# Patient Record
Sex: MALE | Race: WHITE | NOT HISPANIC OR LATINO | URBAN - METROPOLITAN AREA
[De-identification: names, ages, dates, MRNs, and addresses within clinical notes are randomized per-mention and may not be internally consistent; named-entity substitution may affect disease eponyms.]

---

## 2017-01-15 ENCOUNTER — EMERGENCY (EMERGENCY)
Facility: HOSPITAL | Age: 82
LOS: 1 days | Discharge: HOME | End: 2017-01-15
Attending: EMERGENCY MEDICINE | Admitting: EMERGENCY MEDICINE
Payer: MEDICARE

## 2017-01-15 VITALS
DIASTOLIC BLOOD PRESSURE: 81 MMHG | OXYGEN SATURATION: 99 % | RESPIRATION RATE: 16 BRPM | SYSTOLIC BLOOD PRESSURE: 164 MMHG | HEART RATE: 55 BPM | TEMPERATURE: 98 F

## 2017-01-15 VITALS
TEMPERATURE: 98 F | DIASTOLIC BLOOD PRESSURE: 79 MMHG | SYSTOLIC BLOOD PRESSURE: 134 MMHG | OXYGEN SATURATION: 100 % | RESPIRATION RATE: 16 BRPM | HEART RATE: 60 BPM

## 2017-01-15 DIAGNOSIS — S09.90XA UNSPECIFIED INJURY OF HEAD, INITIAL ENCOUNTER: ICD-10-CM

## 2017-01-15 DIAGNOSIS — Z79.82 LONG TERM (CURRENT) USE OF ASPIRIN: ICD-10-CM

## 2017-01-15 DIAGNOSIS — S01.112A LACERATION WITHOUT FOREIGN BODY OF LEFT EYELID AND PERIOCULAR AREA, INITIAL ENCOUNTER: ICD-10-CM

## 2017-01-15 DIAGNOSIS — Z79.899 OTHER LONG TERM (CURRENT) DRUG THERAPY: ICD-10-CM

## 2017-01-15 DIAGNOSIS — I10 ESSENTIAL (PRIMARY) HYPERTENSION: ICD-10-CM

## 2017-01-15 DIAGNOSIS — Z98.41 CATARACT EXTRACTION STATUS, RIGHT EYE: Chronic | ICD-10-CM

## 2017-01-15 DIAGNOSIS — E10.9 TYPE 1 DIABETES MELLITUS WITHOUT COMPLICATIONS: ICD-10-CM

## 2017-01-15 DIAGNOSIS — Z98.42 CATARACT EXTRACTION STATUS, LEFT EYE: Chronic | ICD-10-CM

## 2017-01-15 DIAGNOSIS — I25.10 ATHEROSCLEROTIC HEART DISEASE OF NATIVE CORONARY ARTERY WITHOUT ANGINA PECTORIS: ICD-10-CM

## 2017-01-15 DIAGNOSIS — Z23 ENCOUNTER FOR IMMUNIZATION: ICD-10-CM

## 2017-01-15 DIAGNOSIS — Z98.52 VASECTOMY STATUS: Chronic | ICD-10-CM

## 2017-01-15 PROCEDURE — 70450 CT HEAD/BRAIN W/O DYE: CPT | Mod: 26

## 2017-01-15 PROCEDURE — 99284 EMERGENCY DEPT VISIT MOD MDM: CPT | Mod: 25

## 2017-01-15 PROCEDURE — 12013 RPR F/E/E/N/L/M 2.6-5.0 CM: CPT

## 2017-01-15 RX ORDER — INSULIN LISPRO 100/ML
0 VIAL (ML) SUBCUTANEOUS
Qty: 0 | Refills: 0 | COMMUNITY

## 2017-01-15 RX ORDER — ZOLPIDEM TARTRATE 10 MG/1
0 TABLET ORAL
Qty: 0 | Refills: 0 | COMMUNITY

## 2017-01-15 RX ORDER — ASPIRIN/CALCIUM CARB/MAGNESIUM 324 MG
1 TABLET ORAL
Qty: 0 | Refills: 0 | COMMUNITY

## 2017-01-15 RX ORDER — METOPROLOL TARTRATE 50 MG
0 TABLET ORAL
Qty: 0 | Refills: 0 | COMMUNITY

## 2017-01-15 RX ORDER — TETANUS TOXOID, REDUCED DIPHTHERIA TOXOID AND ACELLULAR PERTUSSIS VACCINE, ADSORBED 5; 2.5; 8; 8; 2.5 [IU]/.5ML; [IU]/.5ML; UG/.5ML; UG/.5ML; UG/.5ML
0.5 SUSPENSION INTRAMUSCULAR ONCE
Qty: 0 | Refills: 0 | Status: COMPLETED | OUTPATIENT
Start: 2017-01-15 | End: 2017-01-15

## 2017-01-15 RX ORDER — ATORVASTATIN CALCIUM 80 MG/1
0 TABLET, FILM COATED ORAL
Qty: 0 | Refills: 0 | COMMUNITY

## 2017-01-15 RX ORDER — INSULIN GLARGINE 100 [IU]/ML
0 INJECTION, SOLUTION SUBCUTANEOUS
Qty: 0 | Refills: 0 | COMMUNITY

## 2017-01-15 RX ADMIN — TETANUS TOXOID, REDUCED DIPHTHERIA TOXOID AND ACELLULAR PERTUSSIS VACCINE, ADSORBED 0.5 MILLILITER(S): 5; 2.5; 8; 8; 2.5 SUSPENSION INTRAMUSCULAR at 10:47

## 2017-01-15 NOTE — ED PROVIDER NOTE - OBJECTIVE STATEMENT
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  Patient definitely remembers tripping and falling and denies prodromal chest pain, back pain, abdominal pain headache, weakness, numbness, or vision changes.  Denies syncope.  Patient landed on his LEFT elbow, BILATERAL knees and struck the LEFT side of his face on the curb.  Patient denies LOC but states he was a little "foggy" for a few minutes.  Now he has no complaints except mild abrasions to bilateral knees, abrasion to LEFT forehead and a laceration to his LEFT eyebrow.  LEFT eyelid has become swollen as well.  No globe injury.  No headache, nausea, or other complaints. Last tetanus unknown.

## 2017-01-15 NOTE — ED ADULT TRIAGE NOTE - CHIEF COMPLAINT QUOTE
Patient s/p trip and fall while crossing the street, hitting head on pavement. Laceration to left brow

## 2017-01-15 NOTE — ED PROVIDER NOTE - PMH
Coronary artery disease involving native coronary artery of native heart without angina pectoris    Essential hypertension    ST elevation myocardial infarction (STEMI), unspecified artery  2006  Stented coronary artery    Type 1 diabetes mellitus without complication

## 2017-01-15 NOTE — ED PROVIDER NOTE - CARE PLAN
Principal Discharge DX:	Closed head injury, initial encounter  Secondary Diagnosis:	Eyebrow laceration, left, initial encounter

## 2017-01-15 NOTE — ED PROVIDER NOTE - PHYSICAL EXAMINATION
Head NCAT except for LEFT forhead with abrasion (cleaned and bacitracin applied) and LEFT eyebrow with 3 cm laceration.  LEFT eyelid swollen with mild hematoma, ice applied.

## 2017-01-15 NOTE — ED PROVIDER NOTE - MEDICAL DECISION MAKING DETAILS
Awake, alert, oriented and nontoxic patient had mechanical fall resulting in abrasions and LEFT eyebrow laceration.  Neuro exam normal. Gait/balance normal. CT negative, will discharge to company of friends.

## 2017-01-15 NOTE — ED PROCEDURE NOTE - CPROC ED POST PROC CARE GUIDE1
Verbal/written post procedure instructions were given to patient/caregiver./Instructed patient/caregiver regarding signs and symptoms of infection./Instructed patient/caregiver to follow-up with primary care physician.

## 2017-01-15 NOTE — ED PROVIDER NOTE - PROGRESS NOTE DETAILS
CT negative, tetanus updated, wounds cleaned and bacitracin applied.  LEFT eyebrow laceration sutured (see notes).

## 2017-01-20 ENCOUNTER — EMERGENCY (EMERGENCY)
Facility: HOSPITAL | Age: 82
LOS: 1 days | Discharge: HOME | End: 2017-01-20
Attending: EMERGENCY MEDICINE | Admitting: EMERGENCY MEDICINE
Payer: MEDICARE

## 2017-01-20 VITALS
SYSTOLIC BLOOD PRESSURE: 140 MMHG | OXYGEN SATURATION: 98 % | TEMPERATURE: 98 F | RESPIRATION RATE: 18 BRPM | HEART RATE: 56 BPM | DIASTOLIC BLOOD PRESSURE: 53 MMHG

## 2017-01-20 DIAGNOSIS — Z98.52 VASECTOMY STATUS: Chronic | ICD-10-CM

## 2017-01-20 DIAGNOSIS — S01.81XD LACERATION WITHOUT FOREIGN BODY OF OTHER PART OF HEAD, SUBSEQUENT ENCOUNTER: ICD-10-CM

## 2017-01-20 DIAGNOSIS — Z79.899 OTHER LONG TERM (CURRENT) DRUG THERAPY: ICD-10-CM

## 2017-01-20 DIAGNOSIS — I25.10 ATHEROSCLEROTIC HEART DISEASE OF NATIVE CORONARY ARTERY WITHOUT ANGINA PECTORIS: ICD-10-CM

## 2017-01-20 DIAGNOSIS — Z98.42 CATARACT EXTRACTION STATUS, LEFT EYE: Chronic | ICD-10-CM

## 2017-01-20 DIAGNOSIS — Z98.41 CATARACT EXTRACTION STATUS, RIGHT EYE: Chronic | ICD-10-CM

## 2017-01-20 DIAGNOSIS — E11.9 TYPE 2 DIABETES MELLITUS WITHOUT COMPLICATIONS: ICD-10-CM

## 2017-01-20 DIAGNOSIS — Z79.4 LONG TERM (CURRENT) USE OF INSULIN: ICD-10-CM

## 2017-01-20 DIAGNOSIS — I10 ESSENTIAL (PRIMARY) HYPERTENSION: ICD-10-CM

## 2017-01-20 PROCEDURE — 99282 EMERGENCY DEPT VISIT SF MDM: CPT

## 2017-01-20 NOTE — ED PROVIDER NOTE - OBJECTIVE STATEMENT
Patient here for uncomplicated suture removal.  Had 5 sutures placed to L eyebrow area 6d ago.   No signs of infection.

## 2017-01-20 NOTE — ED PROVIDER NOTE - MEDICAL DECISION MAKING DETAILS
Uncomplicated suture removal. Patient counseled on wound care and scar formation.   5th suture may be buried under scab. Pt will return for that if it remains after thick scab has fallen off.

## 2017-01-20 NOTE — ED PROVIDER NOTE - SKIN, MLM
Eyebrow laceration covered in large scab, 4 sutures visualized and removed. 5th not located ? fell out or is buried under thick scab.

## 2017-01-20 NOTE — ED ADULT TRIAGE NOTE - CHIEF COMPLAINT QUOTE
"I came to come back and get my stitches removed" +multiple bruises to face, +sutures to left eyebrow area.

## 2017-02-20 PROBLEM — Z95.5 PRESENCE OF CORONARY ANGIOPLASTY IMPLANT AND GRAFT: Chronic | Status: ACTIVE | Noted: 2017-01-15

## 2017-02-20 PROBLEM — I10 ESSENTIAL (PRIMARY) HYPERTENSION: Chronic | Status: ACTIVE | Noted: 2017-01-15

## 2017-02-20 PROBLEM — E10.9 TYPE 1 DIABETES MELLITUS WITHOUT COMPLICATIONS: Chronic | Status: ACTIVE | Noted: 2017-01-15

## 2017-02-20 PROBLEM — I21.3 ST ELEVATION (STEMI) MYOCARDIAL INFARCTION OF UNSPECIFIED SITE: Chronic | Status: ACTIVE | Noted: 2017-01-15

## 2017-02-20 PROBLEM — I25.10 ATHEROSCLEROTIC HEART DISEASE OF NATIVE CORONARY ARTERY WITHOUT ANGINA PECTORIS: Chronic | Status: ACTIVE | Noted: 2017-01-15

## 2017-07-12 PROBLEM — Z00.00 ENCOUNTER FOR PREVENTIVE HEALTH EXAMINATION: Status: ACTIVE | Noted: 2017-07-12

## 2017-07-26 DIAGNOSIS — I71.4 ABDOMINAL AORTIC ANEURYSM, W/OUT RUPTURE: ICD-10-CM

## 2017-07-26 DIAGNOSIS — G47.30 SLEEP APNEA, UNSPECIFIED: ICD-10-CM

## 2017-07-26 DIAGNOSIS — I25.10 ATHEROSCLEROTIC HEART DISEASE OF NATIVE CORONARY ARTERY W/OUT ANGINA PECTORIS: ICD-10-CM

## 2017-07-26 DIAGNOSIS — N40.0 BENIGN PROSTATIC HYPERPLASIA WITHOUT LOWER URINARY TRACT SYMPMS: ICD-10-CM

## 2017-07-26 RX ORDER — ASPIRIN ENTERIC COATED TABLETS 81 MG 81 MG/1
81 TABLET, DELAYED RELEASE ORAL DAILY
Refills: 0 | Status: ACTIVE | COMMUNITY
Start: 2017-07-26

## 2017-07-26 RX ORDER — CHLORHEXIDINE GLUCONATE 4 %
1000 LIQUID (ML) TOPICAL
Refills: 0 | Status: ACTIVE | COMMUNITY
Start: 2017-07-26

## 2017-07-27 DIAGNOSIS — I25.2 OLD MYOCARDIAL INFARCTION: ICD-10-CM

## 2017-07-27 DIAGNOSIS — Z81.8 FAMILY HISTORY OF OTHER MENTAL AND BEHAVIORAL DISORDERS: ICD-10-CM

## 2017-07-27 DIAGNOSIS — Z80.0 FAMILY HISTORY OF MALIGNANT NEOPLASM OF DIGESTIVE ORGANS: ICD-10-CM

## 2017-07-28 ENCOUNTER — APPOINTMENT (OUTPATIENT)
Dept: ENDOCRINOLOGY | Facility: CLINIC | Age: 82
End: 2017-07-28
Payer: MEDICARE

## 2017-07-28 VITALS
DIASTOLIC BLOOD PRESSURE: 60 MMHG | TEMPERATURE: 98.1 F | WEIGHT: 155 LBS | HEART RATE: 64 BPM | HEIGHT: 68.5 IN | SYSTOLIC BLOOD PRESSURE: 140 MMHG | BODY MASS INDEX: 23.22 KG/M2 | OXYGEN SATURATION: 96 %

## 2017-07-28 DIAGNOSIS — E55.9 VITAMIN D DEFICIENCY, UNSPECIFIED: ICD-10-CM

## 2017-07-28 PROCEDURE — 99214 OFFICE O/P EST MOD 30 MIN: CPT

## 2017-08-21 ENCOUNTER — FORM ENCOUNTER (OUTPATIENT)
Age: 82
End: 2017-08-21

## 2017-08-22 ENCOUNTER — MEDICATION RENEWAL (OUTPATIENT)
Age: 82
End: 2017-08-22

## 2017-11-03 ENCOUNTER — APPOINTMENT (OUTPATIENT)
Dept: ENDOCRINOLOGY | Facility: CLINIC | Age: 82
End: 2017-11-03
Payer: MEDICARE

## 2017-11-03 VITALS
HEART RATE: 71 BPM | OXYGEN SATURATION: 96 % | BODY MASS INDEX: 24.33 KG/M2 | DIASTOLIC BLOOD PRESSURE: 71 MMHG | TEMPERATURE: 97.5 F | WEIGHT: 164.25 LBS | HEIGHT: 69 IN | SYSTOLIC BLOOD PRESSURE: 135 MMHG

## 2017-11-03 PROCEDURE — 99214 OFFICE O/P EST MOD 30 MIN: CPT

## 2017-11-14 ENCOUNTER — MEDICATION RENEWAL (OUTPATIENT)
Age: 82
End: 2017-11-14

## 2017-11-17 ENCOUNTER — MEDICATION RENEWAL (OUTPATIENT)
Age: 82
End: 2017-11-17

## 2017-12-13 ENCOUNTER — MEDICATION RENEWAL (OUTPATIENT)
Age: 82
End: 2017-12-13

## 2017-12-13 DIAGNOSIS — G47.00 INSOMNIA, UNSPECIFIED: ICD-10-CM

## 2018-02-01 ENCOUNTER — MEDICATION RENEWAL (OUTPATIENT)
Age: 83
End: 2018-02-01

## 2018-02-02 ENCOUNTER — APPOINTMENT (OUTPATIENT)
Dept: ENDOCRINOLOGY | Facility: CLINIC | Age: 83
End: 2018-02-02
Payer: MEDICARE

## 2018-02-02 VITALS
HEART RATE: 56 BPM | SYSTOLIC BLOOD PRESSURE: 158 MMHG | TEMPERATURE: 97.8 F | OXYGEN SATURATION: 99 % | DIASTOLIC BLOOD PRESSURE: 66 MMHG | WEIGHT: 159 LBS | BODY MASS INDEX: 23.82 KG/M2 | HEIGHT: 68.5 IN

## 2018-02-02 PROCEDURE — 99214 OFFICE O/P EST MOD 30 MIN: CPT

## 2018-02-27 ENCOUNTER — MEDICATION RENEWAL (OUTPATIENT)
Age: 83
End: 2018-02-27

## 2018-02-27 RX ORDER — METOPROLOL SUCCINATE 25 MG/1
25 TABLET, EXTENDED RELEASE ORAL DAILY
Qty: 90 | Refills: 3 | Status: ACTIVE | COMMUNITY
Start: 2018-02-27 | End: 1900-01-01

## 2018-05-02 ENCOUNTER — MEDICATION RENEWAL (OUTPATIENT)
Age: 83
End: 2018-05-02

## 2018-06-01 ENCOUNTER — OUTPATIENT (OUTPATIENT)
Dept: OUTPATIENT SERVICES | Facility: HOSPITAL | Age: 83
LOS: 1 days | End: 2018-06-01
Payer: MEDICARE

## 2018-06-01 ENCOUNTER — APPOINTMENT (OUTPATIENT)
Dept: RADIOLOGY | Facility: CLINIC | Age: 83
End: 2018-06-01

## 2018-06-01 ENCOUNTER — APPOINTMENT (OUTPATIENT)
Dept: ORTHOPEDIC SURGERY | Facility: CLINIC | Age: 83
End: 2018-06-01
Payer: MEDICARE

## 2018-06-01 VITALS — WEIGHT: 159 LBS | BODY MASS INDEX: 23.82 KG/M2 | RESPIRATION RATE: 16 BRPM | HEIGHT: 68.5 IN

## 2018-06-01 DIAGNOSIS — Z98.41 CATARACT EXTRACTION STATUS, RIGHT EYE: Chronic | ICD-10-CM

## 2018-06-01 DIAGNOSIS — M25.562 PAIN IN LEFT KNEE: ICD-10-CM

## 2018-06-01 DIAGNOSIS — Z98.52 VASECTOMY STATUS: Chronic | ICD-10-CM

## 2018-06-01 DIAGNOSIS — M17.0 BILATERAL PRIMARY OSTEOARTHRITIS OF KNEE: ICD-10-CM

## 2018-06-01 DIAGNOSIS — Z98.42 CATARACT EXTRACTION STATUS, LEFT EYE: Chronic | ICD-10-CM

## 2018-06-01 PROCEDURE — 73564 X-RAY EXAM KNEE 4 OR MORE: CPT | Mod: 26,LT

## 2018-06-01 PROCEDURE — 99204 OFFICE O/P NEW MOD 45 MIN: CPT

## 2018-06-01 PROCEDURE — 73564 X-RAY EXAM KNEE 4 OR MORE: CPT

## 2018-08-01 ENCOUNTER — APPOINTMENT (OUTPATIENT)
Dept: ENDOCRINOLOGY | Facility: CLINIC | Age: 83
End: 2018-08-01
Payer: MEDICARE

## 2018-08-01 VITALS
BODY MASS INDEX: 23.67 KG/M2 | TEMPERATURE: 98.7 F | DIASTOLIC BLOOD PRESSURE: 65 MMHG | OXYGEN SATURATION: 97 % | HEIGHT: 68.5 IN | SYSTOLIC BLOOD PRESSURE: 125 MMHG | HEART RATE: 71 BPM | WEIGHT: 158 LBS

## 2018-08-01 PROCEDURE — 99214 OFFICE O/P EST MOD 30 MIN: CPT

## 2018-08-13 ENCOUNTER — MEDICATION RENEWAL (OUTPATIENT)
Age: 83
End: 2018-08-13

## 2018-10-19 ENCOUNTER — MEDICATION RENEWAL (OUTPATIENT)
Age: 83
End: 2018-10-19

## 2018-10-19 RX ORDER — ENALAPRIL MALEATE 5 MG/1
5 TABLET ORAL DAILY
Qty: 90 | Refills: 3 | Status: ACTIVE | COMMUNITY
Start: 2017-07-26 | End: 1900-01-01

## 2018-11-04 ENCOUNTER — MEDICATION RENEWAL (OUTPATIENT)
Age: 83
End: 2018-11-04

## 2018-11-04 RX ORDER — BLOOD SUGAR DIAGNOSTIC
STRIP MISCELLANEOUS
Qty: 400 | Refills: 3 | Status: ACTIVE | COMMUNITY
Start: 2017-11-17 | End: 1900-01-01

## 2018-11-04 RX ORDER — LANCETS
EACH MISCELLANEOUS
Qty: 400 | Refills: 3 | Status: ACTIVE | COMMUNITY
Start: 2018-11-04 | End: 1900-01-01

## 2018-12-03 ENCOUNTER — APPOINTMENT (OUTPATIENT)
Dept: ENDOCRINOLOGY | Facility: CLINIC | Age: 83
End: 2018-12-03
Payer: MEDICARE

## 2018-12-03 VITALS
TEMPERATURE: 98.3 F | SYSTOLIC BLOOD PRESSURE: 137 MMHG | HEART RATE: 59 BPM | DIASTOLIC BLOOD PRESSURE: 65 MMHG | WEIGHT: 153 LBS | HEIGHT: 68.5 IN | BODY MASS INDEX: 22.92 KG/M2 | OXYGEN SATURATION: 97 %

## 2018-12-03 PROCEDURE — 99214 OFFICE O/P EST MOD 30 MIN: CPT

## 2019-01-14 ENCOUNTER — MEDICATION RENEWAL (OUTPATIENT)
Age: 84
End: 2019-01-14

## 2019-01-23 ENCOUNTER — MEDICATION RENEWAL (OUTPATIENT)
Age: 84
End: 2019-01-23

## 2019-02-08 ENCOUNTER — MEDICATION RENEWAL (OUTPATIENT)
Age: 84
End: 2019-02-08

## 2019-04-10 ENCOUNTER — APPOINTMENT (OUTPATIENT)
Dept: ENDOCRINOLOGY | Facility: CLINIC | Age: 84
End: 2019-04-10
Payer: MEDICARE

## 2019-04-10 VITALS
HEART RATE: 61 BPM | TEMPERATURE: 97.9 F | BODY MASS INDEX: 22.62 KG/M2 | SYSTOLIC BLOOD PRESSURE: 135 MMHG | WEIGHT: 151 LBS | OXYGEN SATURATION: 98 % | HEIGHT: 68.5 IN | DIASTOLIC BLOOD PRESSURE: 68 MMHG

## 2019-04-10 DIAGNOSIS — M65.331 TRIGGER FINGER, RIGHT MIDDLE FINGER: ICD-10-CM

## 2019-04-10 PROCEDURE — 99214 OFFICE O/P EST MOD 30 MIN: CPT

## 2019-04-12 PROBLEM — M65.331 TRIGGER MIDDLE FINGER OF RIGHT HAND: Status: ACTIVE | Noted: 2019-04-10

## 2019-04-12 NOTE — ASSESSMENT
[FreeTextEntry1] : 1) Type 2 DM:\par --Given his report of occasional overnight hypoglycemia (whichi is clearly not due to a carryover of his pre-supper Humalog), will decrease the Lantus to 15 units.  He is to let me know if he has any further overnight hypoglycemia.\par \par 2)\par \par 3) Hypertension:  Given the hyperkalemia on his current bloodwork, which does not seem to be related to dietary factors (intake of citrus fruit, potatoes, etc is minimal), to decrease the enalapril to 2.5 mg/day

## 2019-04-12 NOTE — PHYSICAL EXAM
[Alert] : alert [Normal Sclera/Conjunctiva] : normal sclera/conjunctiva [Healthy Appearance] : healthy appearance [EOMI] : extra ocular movement intact [No Proptosis] : no proptosis [Normal Outer Ear/Nose] : the ears and nose were normal in appearance [No Lid Lag] : no lid lag [No Neck Mass] : no neck mass was observed [Thyroid Not Enlarged] : the thyroid was not enlarged [No LAD] : no lymphadenopathy [Regular Rhythm] : with a regular rhythm [Clear to Auscultation] : lungs were clear to auscultation bilaterally [Normal Rate] : heart rate was normal  [No Edema] : there was no peripheral edema [Carotids Normal] : carotid pulses were normal with no bruits [Soft] : abdomen soft [Not Tender] : non-tender [No HSM] : no hepato-splenomegaly [Normal] : normal and non tender [Normal Gait] : normal gait [No Joint Swelling] : no joint swelling seen [No Involuntary Movements] : no involuntary movements were seen [Normal Strength/Tone] : muscle strength and tone were normal [No Rash] : no rash [No Tremors] : no tremors [Normal Sensation on Monofilament Testing] : normal sensation on monofilament testing of lower extremities [Normal Affect] : the affect was normal [Normal Mood] : the mood was normal [Gynecomastia] : no gynecomastia [Kyphosis] : no kyphosis present [Foot Ulcers] : no foot ulcers [Acanthosis Nigricans] : no acanthosis nigricans [de-identified] : Appears thinner [de-identified] : Hearing aids in both ears [de-identified] : Pupils miotic.  No corneal arcus or xanthelasma [de-identified] : DP pulses 1+ on the left, non-palpable (with ?? slightly delayed capillary refill) on the right [de-identified] : No murmurs [de-identified] : Moderate triggering of the R third finger with thickened flexor tendon.  Flexion deformity and Dupuytrems contracture L 4th finger [de-identified] : No cervical or supraclavicular adenopathy [de-identified] : Vibratory sensation moderately decreased over the toes

## 2019-04-12 NOTE — HISTORY OF PRESENT ILLNESS
[FreeTextEntry1] : 85-year-old man who is followed for insulin-requiring type 2 DM, hyperlipidemia and hypertension.  HIs diabetes was initially diagnosed in 1983, and he has been insulin-requiring since 1993.  His glycemic control has worsened somewhat in the past few years, with a rise in A1c levels to the 7.5-8.5% range.  Barriers to improved control include the large number of meals which he eats  out of the house, as well as his worsening cognitive function.  HIs other active medical problems include IHD (s/p NSTEMI and emergency PCI/stent to the RCA in 2006), moderate dementia, and a stable abdominal aortic aneurysm.\par \par As usual, came to the visit accompanied by his daughter.\antonella Says that he feels "great," though is aware that his weight continues to drift down.  Says that his appetite is fine.\antonella --Is still having cereal (either GrapeNuts or oatmeal) for breakfast.  Does not see any significant difference in his pre-lunch fingersticks between the two of these\par --Lunch is still salad from [employer], but does not always have bread with it.  Eats lunch with his friends 1-2X/week, sometimes Chinese food.\par --Supper is a restaurant meal or takeout foot.  (Will "sometimes" have dessert when eating out).  Protein is fish 2-3X/week\antonella Has been monitoring fingersticks 4X/day, but forgot to bring his log book.  Fingersticks seem to be fluctuating in the 100-300 range, but in no particular pattern, and he is unable to correlate the higher numbers with his food intake.  Says that he misses very few insulin doses--only if he has an "irregular" day.\antonella Has had a few hypoglycemic reactions in the middle of the night.\par Still playing tennis twice a week, swimming at least 2-3X/week.\par Other MDs--Saw Dr. Dumont (Cardiology) in January, will be seeing Dr. Lam next week.  Is not sure when he is due to see his ophthalmologist Dr. Carcamo.\antonella Admits to mild paresthesias in his toes.\par \par \antonella

## 2019-04-12 NOTE — REVIEW OF SYSTEMS
[Fatigue] : no fatigue [Decreased Appetite] : appetite not decreased [Chills] : no chills [Fever] : no fever [Blurry Vision] : no blurred vision [Dry Eyes] : no dryness of the eyes [Dysphagia] : no dysphagia [Eyes Itch] : no itching of the eyes [Palpitations] : no palpitations [Chest Pain] : no chest pain [Lower Ext Edema] : no lower extremity edema [Wheezing] : no wheezing was heard [Shortness Of Breath] : no shortness of breath [Cough] : no cough [SOB on Exertion] : no shortness of breath during exertion [Nausea] : no nausea [Constipation] : no constipation [Diarrhea] : no diarrhea [Polyuria] : no polyuria [Heartburn] : no heartburn [Dysuria] : no dysuria [Joint Pain] : no joint pain [Joint Stiffness] : no joint stiffness [Muscle Cramps] : no muscle cramps [Myalgia] : no myalgia  [Hair Loss] : no hair loss [Headache] : no headaches [Dry Skin] : no dry skin [Pain/Numbness of Digits] : no pain/numbness of digits [Tremors] : no tremors [Poor Balance] : steady state balance [Difficulty Walking] : no difficulty walking [Depression] : no depression [Suicidal Ideation] : no suicidal ideation [Homicidal Ideation] : no homicidal ideation [Polydipsia] : no polydipsia [Cold Intolerance] : cold tolerant [Easy Bleeding] : no ~M tendency for easy bleeding [Heat Intolerance] : heat tolerant [Easy Bruising] : no tendency for easy bruising [FreeTextEntry2] : Has lost another 2 lb in weight [FreeTextEntry4] : Says that he has been wearing his hearing aids more consistently [FreeTextEntry8] : Nocturia once a night [de-identified] : Admits to "memory problems"

## 2019-05-14 ENCOUNTER — RX RENEWAL (OUTPATIENT)
Age: 84
End: 2019-05-14

## 2019-08-02 ENCOUNTER — APPOINTMENT (OUTPATIENT)
Dept: ENDOCRINOLOGY | Facility: CLINIC | Age: 84
End: 2019-08-02
Payer: MEDICARE

## 2019-08-02 VITALS
TEMPERATURE: 98.6 F | OXYGEN SATURATION: 95 % | SYSTOLIC BLOOD PRESSURE: 151 MMHG | BODY MASS INDEX: 23.22 KG/M2 | DIASTOLIC BLOOD PRESSURE: 69 MMHG | HEART RATE: 68 BPM | HEIGHT: 68.5 IN | WEIGHT: 155 LBS

## 2019-08-02 PROCEDURE — 99214 OFFICE O/P EST MOD 30 MIN: CPT

## 2019-08-02 NOTE — ASSESSMENT
[FreeTextEntry1] : 1) Type 2 DM:  Glycemic control is obviously poor, but the A1c level correlates with the fingersticks, many of which are over 200 mg%, occasionally even over 400 mg%.  He seem to be taking his insulin injections fairly consistently.  His diet history is almost certainly "incomplete," since he has managed to gain 4 lb in weight despite the poor glycemic control.  His cognitive deficits prevent him from correlating his glycemic fluctuations with his food intake or exercise.  \par --Evaluated his ability to correctly draw up insulin, and he did it perfectly--(even injected air into the bottle before drawning up the insulin)\par --Discussed possible causes for the unexplained glucose spikes with his daughter, who indicated that he eats out with friends for lunch at least twice a week (and thinks that at least some of these are to Chinese restaurants) and also suspects that at least some of the restaurant suppers include desserts.\par --Suspect that his cappucinos in the afternoon may be sweetened with regular sugar\par --Given the reasonable control of his morning blood sugars, plus the stable or decreasing trend of his glucoses overnight, will not increase the Lantus.  Will also not try to "salazar" the intermittent daytime hyperglycemia with higher Novolog doses given the occasional fingersticks near or below 100 and the risk of hypoglycemia if he fails to "cheat" with his diet.\par \par 2) Hyperlipidemia:  LDL-cholesterol is only slightly above his target of 70 mg%\par --Continue atorvastatin 80 mg/day\par \par 3) Hypertension:  Systolic BP is mildly elevated today, but he did not take any of his BP meds this morning\par \par See for follow-up in 4 months.  CMP, lipids, A1c, CBC, B-12 before the next visit\par  [FreeTextEntry2] : Diet, assessment of insulin drawing technique

## 2019-08-02 NOTE — HISTORY OF PRESENT ILLNESS
[FreeTextEntry1] : 85-year-old man who is followed for insulin-requiring type 2 DM, hyperlipidemia and hypertension.  HIs diabetes was initially diagnosed in 1983, and he has been insulin-requiring since 1993.  His glycemic control has worsened somewhat in the past few years, with a rise in A1c levels to the 7.5-9.0% range.  Barriers to improved control include the large number of meals which he eats  out of the house, as well as his worsening cognitive function.  HIs other active medical problems include IHD (s/p NSTEMI and emergency PCI/stent to the RCA in 2006), moderate dementia, and a stable abdominal aortic aneurysm.\par \antonella As usual, came to the visit accompanied by his daughter\antonella Is still testing fingersticks 4X/day, but says that they are higher and his log book verifies this. Has been taking 15 units of Lantus qhs, pre-meal Novolog 11/12/15. Has numerous readings in the 200s and 300s.  The only patterns which are noticeable are that his morning fingersticks are frequently in the 100-180 range, and the highest readings are before supper.  Had one spike to 527 two nights ago after eating out, but does not remember where he went or what he ate.  \par Breakfast is still cereal--most often GrapeNuts (measures this with a scoop), occasionally oatmeal.  Has blueberries with the cereal,, sometimes with "a few oz" of OJ.  He does some type of exercise (tennis or swimming) after breakfast 5 days/week, almost never on Saturday, often not on Sunday.  Lunch is takeout food from [employer], usually a salad, often without any bread.  He denies any snacking in the afternoon, but will sometimes have a capuccino in the afternoon.  (He does not add any sweetener to the cappucino, but is not sure whether any is added by the store).  Supper is almost always eaten out.\antonella Denies missing any insulin injections  \antonella Saw Dr. Barry regarding the R 3rd trigger finger, and apparently received a steroid injection.  (Nothing was apparently done or suggested about the flexion contracture of the left 4th finger.\antonella Seeing his cardiologist Dr. Dumont once a year.  (Last saw her in January).\antonella Sees his ophthalmologist Dr. Carcamo once a year as well, and is up to date

## 2019-08-02 NOTE — PHYSICAL EXAM
[Alert] : alert [Well Nourished] : well nourished [Healthy Appearance] : healthy appearance [Normal Sclera/Conjunctiva] : normal sclera/conjunctiva [No Proptosis] : no proptosis [EOMI] : extra ocular movement intact [No Lid Lag] : no lid lag [Normal Outer Ear/Nose] : the ears and nose were normal in appearance [No Neck Mass] : no neck mass was observed [No LAD] : no lymphadenopathy [Normal Rate] : heart rate was normal  [Clear to Auscultation] : lungs were clear to auscultation bilaterally [Carotids Normal] : carotid pulses were normal with no bruits [Regular Rhythm] : with a regular rhythm [No Edema] : there was no peripheral edema [Soft] : abdomen soft [Not Tender] : non-tender [No HSM] : no hepato-splenomegaly [Normal] : normal and non tender [No CVA Tenderness] : no ~M costovertebral angle tenderness [Normal Gait] : normal gait [No Joint Swelling] : no joint swelling seen [Normal Strength/Tone] : muscle strength and tone were normal [No Involuntary Movements] : no involuntary movements were seen [No Rash] : no rash [No Tremors] : no tremors [Normal Mood] : the mood was normal [Normal Affect] : the affect was normal [Normal Sensation on Monofilament Testing] : normal sensation on monofilament testing of lower extremities [Gynecomastia] : no gynecomastia [Kyphosis] : no kyphosis present [Foot Ulcers] : no foot ulcers [Acanthosis Nigricans] : no acanthosis nigricans [de-identified] : Pupils miotic.  No corneal arcus or xanthelasma [de-identified] : Hearing aids in both ears [de-identified] : No murmurs [de-identified] : Thyroid gland at the upper limits of normal in size [de-identified] : DP pulses non-palpable bilaterally but capillary refill is normal [de-identified] : No cervical or supraclavicular adenopathy [de-identified] : Flexion contracture of the left 4th finger.  Multiple Dupuytrens contractures, but no triggering of the R third finger [de-identified] : Significant male-pattern hair loss [de-identified] : Vibratory sensation nearly abent over the toes

## 2019-08-02 NOTE — DATA REVIEWED
[FreeTextEntry1] : [facility]  (7/27/19)\par \par ,  A1c 8.7%\par CMP WNL\par Urine microalb ratio negative at 11  (normal < 30)\par LDL 79, HDL 57, TG 56\par TSH level normal at 2.76 uU/ml  (0.4-4.5)

## 2019-08-02 NOTE — REVIEW OF SYSTEMS
[Fatigue] : no fatigue [Decreased Appetite] : appetite not decreased [Fever] : no fever [Chills] : no chills [Blurry Vision] : no blurred vision [Dry Eyes] : no dryness of the eyes [Eyes Itch] : no itching of the eyes [Dysphagia] : no dysphagia [Chest Pain] : no chest pain [Palpitations] : no palpitations [Lower Ext Edema] : no lower extremity edema [Shortness Of Breath] : no shortness of breath [Wheezing] : no wheezing was heard [SOB on Exertion] : no shortness of breath during exertion [Cough] : no cough [Nausea] : no nausea [Constipation] : no constipation [Diarrhea] : no diarrhea [Heartburn] : no heartburn [Dysuria] : no dysuria [Polyuria] : no polyuria [Joint Stiffness] : no joint stiffness [Joint Pain] : no joint pain [Muscle Cramps] : no muscle cramps [Hair Loss] : no hair loss [Myalgia] : no myalgia  [Dry Skin] : no dry skin [Headache] : no headaches [Tremors] : no tremors [Difficulty Walking] : no difficulty walking [Depression] : no depression [Anxiety] : no anxiety [Insomnia] : no insomnia [Polydipsia] : no polydipsia [Stress] : no stress [Heat Intolerance] : heat tolerant [Easy Bleeding] : no ~M tendency for easy bleeding [Easy Bruising] : no tendency for easy bruising [FreeTextEntry2] : Has gained 4 lb since his last visit [FreeTextEntry4] : Wearing his hearing aids more consistently [FreeTextEntry8] : Nocturia 1-2X/night [de-identified] : Describes his feet as feeling "thick," but has no ongoing paresthesias or neuropathic pain

## 2019-10-02 ENCOUNTER — MEDICATION RENEWAL (OUTPATIENT)
Age: 84
End: 2019-10-02

## 2019-10-07 ENCOUNTER — OTHER (OUTPATIENT)
Age: 84
End: 2019-10-07

## 2019-12-06 ENCOUNTER — APPOINTMENT (OUTPATIENT)
Dept: ENDOCRINOLOGY | Facility: CLINIC | Age: 84
End: 2019-12-06
Payer: MEDICARE

## 2019-12-06 VITALS
BODY MASS INDEX: 22.92 KG/M2 | HEART RATE: 58 BPM | DIASTOLIC BLOOD PRESSURE: 67 MMHG | OXYGEN SATURATION: 98 % | SYSTOLIC BLOOD PRESSURE: 164 MMHG | HEIGHT: 68.5 IN | WEIGHT: 153 LBS | TEMPERATURE: 97.5 F

## 2019-12-06 PROCEDURE — 99214 OFFICE O/P EST MOD 30 MIN: CPT

## 2020-04-04 NOTE — DATA REVIEWED
[FreeTextEntry1] : [facility]  (11/27/19)\par \par ,  A1c 10.4%\par CMP and CBC WNL\par LDL 75, HDL 62, TG 92\par

## 2020-04-04 NOTE — HISTORY OF PRESENT ILLNESS
[FreeTextEntry1] : 86-year-old man who is followed for insulin-requiring type 2 DM, hyperlipidemia and hypertension.  HIs diabetes was initially diagnosed in 1983, and he has been insulin-requiring since 1993.  His glycemic control has worsened somewhat in the past few years, with a rise in A1c levels to the 7.5-9.0% range.  Barriers to improved control include the large number of meals which he eats  out of the house, as well as his worsening cognitive function.  HIs other active medical problems include IHD (s/p NSTEMI and emergency PCI/stent to the RCA in 2006), moderate dementia, and a stable abdominal aortic aneurysm.\par \antonella Came to the visit accompanied by his daughter.\par Still testing fingersticks 4X/day, and brought his log.  Glucoses are quite high, frequently in the 300-400 range, occasionally even over 400 mg%.  Review of the log book indicates gaps in monitoring which also likely coincide with missed insulin doses--which he admits to--says "sometimes I get home from playing tennis and go right to the refrigerator."  \par Admits to some increase in thirst and urination.\par Breakfast is still cold cereal, lunch is prepared food from [employer], and suppers are nearly all restaurant meals. Says that he is still drinking juice, but only an occasional small glass.  Has been drinking more soda, but this is always diet soda.  \par Most of the pre-breakfast fingersticks are in the 200 range, but there are occasional values between 100 and 150, and at least a few occasions when his bedtime fingerstick will be in the low 100s and it will remain stable to the next morning.\antonella Saw Dr. Goodwin for Urology follow-up last month.  \par Retinal exam with Dr. Carcamo was apparently negative.\antonella Saw his PCP Dr. Lam in October.\antonella Will be seeing his cardiologist Dr. Dumont in January

## 2020-04-04 NOTE — REVIEW OF SYSTEMS
[Hair Loss] : hair loss [Dry Skin] : dry skin [Polydipsia] : polydipsia [Fatigue] : no fatigue [Decreased Appetite] : appetite not decreased [Fever] : no fever [Chills] : no chills [Blurry Vision] : no blurred vision [Dry Eyes] : no dryness of the eyes [Eyes Itch] : no itching of the eyes [Dysphagia] : no dysphagia [Palpitations] : no palpitations [Leg Claudication] : no intermittent leg claudication [Lower Ext Edema] : no lower extremity edema [Shortness Of Breath] : no shortness of breath [Wheezing] : no wheezing was heard [Cough] : no cough [SOB on Exertion] : no shortness of breath during exertion [Nausea] : no nausea [Constipation] : no constipation [Diarrhea] : no diarrhea [Heartburn] : no heartburn [Polyuria] : no polyuria [Dysuria] : no dysuria [Joint Pain] : no joint pain [Joint Stiffness] : no joint stiffness [Muscle Cramps] : no muscle cramps [Myalgia] : no myalgia  [Headache] : no headaches [Tremors] : no tremors [Pain/Numbness of Digits] : no pain/numbness of digits [Difficulty Walking] : no difficulty walking [Depression] : no depression [Anxiety] : no anxiety [Stress] : no stress [Cold Intolerance] : cold tolerant [Heat Intolerance] : heat tolerant [Easy Bleeding] : no ~M tendency for easy bleeding [Easy Bruising] : no tendency for easy bruising [FreeTextEntry2] : Has lost 2 lb since his last visit [FreeTextEntry4] : Hearing aids do not seem to be working as well [FreeTextEntry5] : Describes occasional "tiredness" in his chest on exertion [FreeTextEntry8] : Nocturia twice a night [de-identified] : Has been taking 25 mg of trazodone at night, sometimes a second dose.  Also taking an over-the-counter preparation ("ZZZ")

## 2020-04-04 NOTE — ASSESSMENT
[FreeTextEntry1] : 1) Type 2 DM:  Glycemic control is obviously extremely poor, as assessed by both A1c level and fingerstick monitoring.  Much of the hyperglycemia is likely due to missed doses of insulin plus irregular carbohydrate intake with his takeout and restaurant meals.  Additional factors to consider would be the accuracy of his dosing (though this had been assessed a few months ago and he appeared to be drawing up insulin correctly), and possible irregular or impaired absorption of insulin from injections given in the mildly lipohypertrophic areas of his abdomen.\par --Pt was instructed to avoid injecting into the mildly lipohypertrophic areas of his abdomen\par --Given the overnight stability of his blood sugars when his bedtime reading is < 150, will continue the Lantus at 15 units\par --Will increase the lunch dose to 15 units, the supper dose of Novolog to 17 units\par --Continue fingerstick monitoring 4X/day\par \par 2) Hypercholesterolemia:  LDL-cholesterol level is only slightly above his target of 70 mg%.  Would ordinarily have some concern about missed doses of atorvastatin, but he is likely taking it fairly consistently.\par --Continue atorvastatin 80 mg/day\par \par 3) Hypertension:  Systolic BP is moderately elevated at today's visit.  Unfortunately, no home monitoring is available to help determine whether today's reading is a representative value.\par --Since he reports normal BPs at his other MD visits, will continue his current regimen of enalapril, metoprolol and HCTZ.\par \par 4) Insomnia--Has ikely been taking two 25 mg doses of trazodone most nights, and the "ZZZ" with uncertain frequency.  The latter appears to be a melatonin preparation rather than an antihistamine, so I have somewhat less concern about it.  Nonetheless, have cautioned him against taking more than 25 mg of trazodone.\par \par See for follow-up in 4 months.  CMP, lipids, A1c, TFTs, microalb before the visit\par Instructions discussed with both the pt and his daughter.\par  [FreeTextEntry2] : Diet, injection sites, sleep meds

## 2020-04-04 NOTE — PHYSICAL EXAM
[Alert] : alert [Well Nourished] : well nourished [Normal Sclera/Conjunctiva] : normal sclera/conjunctiva [Healthy Appearance] : healthy appearance [EOMI] : extra ocular movement intact [No Lid Lag] : no lid lag [No Proptosis] : no proptosis [Normal Outer Ear/Nose] : the ears and nose were normal in appearance [No Neck Mass] : no neck mass was observed [No LAD] : no lymphadenopathy [Thyroid Not Enlarged] : the thyroid was not enlarged [Clear to Auscultation] : lungs were clear to auscultation bilaterally [Regular Rhythm] : with a regular rhythm [Normal Rate] : heart rate was normal  [Carotids Normal] : carotid pulses were normal with no bruits [Not Tender] : non-tender [No Edema] : there was no peripheral edema [No HSM] : no hepato-splenomegaly [Soft] : abdomen soft [Normal] : normal and non tender [No CVA Tenderness] : no ~M costovertebral angle tenderness [Normal Gait] : normal gait [No Involuntary Movements] : no involuntary movements were seen [No Joint Swelling] : no joint swelling seen [Normal Strength/Tone] : muscle strength and tone were normal [No Rash] : no rash [No Tremors] : no tremors [Normal Sensation on Monofilament Testing] : normal sensation on monofilament testing of lower extremities [Normal Affect] : the affect was normal [Normal Mood] : the mood was normal [Gynecomastia] : no gynecomastia [Kyphosis] : no kyphosis present [Foot Ulcers] : no foot ulcers [Acanthosis Nigricans] : no acanthosis nigricans [de-identified] : Pupils miotic.  No corneal arcus or xanthelasma [de-identified] : Moderate hearing impairment is evident in normal conversation [de-identified] : No murmurs [de-identified] : DP pulses 2+ on the left, non-palpable on the right [de-identified] : Two areas of mild lipohypertrophy in the mid-abdomen on either side of the umbilicus [de-identified] : No cervical or supraclavicular adenopathy [de-identified] : Significant male-pattern hair loss [de-identified] : Vibratory sensation absent over the toes, markedly diminished over the malleoli

## 2020-04-17 ENCOUNTER — APPOINTMENT (OUTPATIENT)
Dept: ENDOCRINOLOGY | Facility: CLINIC | Age: 85
End: 2020-04-17
Payer: MEDICARE

## 2020-04-17 PROCEDURE — 99443: CPT

## 2020-07-07 ENCOUNTER — APPOINTMENT (OUTPATIENT)
Dept: ENDOCRINOLOGY | Facility: CLINIC | Age: 85
End: 2020-07-07
Payer: MEDICARE

## 2020-07-07 VITALS
TEMPERATURE: 99 F | DIASTOLIC BLOOD PRESSURE: 58 MMHG | BODY MASS INDEX: 22.49 KG/M2 | OXYGEN SATURATION: 97 % | HEIGHT: 67.32 IN | SYSTOLIC BLOOD PRESSURE: 123 MMHG | HEART RATE: 75 BPM | WEIGHT: 145 LBS

## 2020-07-07 PROCEDURE — 99214 OFFICE O/P EST MOD 30 MIN: CPT

## 2020-07-08 NOTE — REVIEW OF SYSTEMS
[Dry Skin] : dry skin [Difficulty Walking] : difficulty walking [Pain/Numbness of Digits] : pain/numbness of digits [Fatigue] : no fatigue [Decreased Appetite] : appetite not decreased [Fever] : no fever [Chills] : no chills [Dry Eyes] : no dryness [Eye Pain] : no pain [Blurred Vision] : no blurred vision [Eyes Itch] : no itch [Dysphagia] : no dysphagia [Chest Pain] : no chest pain [Palpitations] : no palpitations [Lower Ext Edema] : no lower extremity edema [Shortness Of Breath] : no shortness of breath [Cough] : no cough [Wheezing] : no wheezing [SOB on Exertion] : no shortness of breath on exertion [Nausea] : no nausea [Constipation] : no constipation [Heartburn] : no heartburn [Diarrhea] : no diarrhea [Polyuria] : no polyuria [Dysuria] : no dysuria [Joint Pain] : no joint pain [Myalgia] : no myalgia  [Muscle Cramps] : no muscle cramps [Back Pain] : no back pain [Ulcer] : no ulcer [Headaches] : no headaches [Tremors] : no tremors [Depression] : no depression [Anxiety] : no anxiety [Stress] : no stress [Polydipsia] : no polydipsia [Easy Bleeding] : no ~M tendency for easy bleeding [Easy Bruising] : no tendency for easy bruising [FreeTextEntry2] : Has lost 8 lb since his last visit in December of 2019 [FreeTextEntry4] : Hearing aids are barely functioning [FreeTextEntry8] : Nocturia 1-2X/night [FreeTextEntry9] : See comments in the HPI re: leg weakness. Says that he has been having more difficulty rising from a sitting position [de-identified] : Using Trazodone 25 mg at night to sleep [de-identified] : Admits to dry mouth and increased thirst, mostly in the middle of the night

## 2020-07-08 NOTE — DISCUSSION/SUMMARY
[FreeTextEntry1] : Pt had an office appointment for follow-up last month which was cancelled due to COVID restrictions.  He then called at the end of the month asking about re-scheduling, or discussing the results of the bloodwork which he had done on 3/19 over the phone.  Was unable to reach him last week, but did get back to him today, and agreed to do an official office visit over the phone.  He understands that a bill will be submitted for the visit.  (Verbal consent was given at 4:15 PM)\par \par The visit will likely be sub-optimal, however--partially because the pt's daughter usually comes with him to "fill in the spaces" regarding recent events, and to keep track of any medication changes.\par The pt continues to test fingersticks 4X/day, and although his written log (which is usually quite complete) is unavailable, I had him read values for the past week:  (AC and HS)\par 4/10   263--355--249--130\par 4/11   288--302--373--132\par 4/12  558--341--367--322\par 4/13  140--295--446--205\par 4/14  249--100--245--330\par 4/15  290--280--425--298\par 4/16  208--408--472--152\par 4/17  266--373\par Diet was reviewed--Breakfast is still [employer] cereal, but he is not measuring it out.  Lunch is some type of salad (tuna, beet, etc) which he takes out from [facility], but usually without any starch.  Supper is also takeout food, and it is impossible to assess any consistency in this regard.\par Of note is that he mentioned that he has occasional overnight hypoglycemic symptoms, but they do not usually awaken him.  He senses them when he gets up to urinate.  Of more concern is that he does not do a fingerstick to confirm them, and does not treat them--says that he takes another 25 mg of trazodone in order to fall back to sleep.  Says that these occur only once or twice a month.\par Does not check any BPs at home.\par Had a tele-visit with his PCP Dr. Lam earlier in the week.  Dr. Lam contacted me that day to ask if the enalapril had been discontinued because the pt reported not taking it, but I told him that he was supposed to still be taking 5 mg/day.\par Asked the pt whether he has been avoiding injecting into the lipohypertrophic areas of his abdomen, and although he said that he was, he did not sound convincing.  Said that he has been doing breakfast and lunch Novolog in his abdomen, supper and Lantus injections in his legs. \par Pt also admitted more directly to his cognitive deficits.  Asked him if he occasionally forgot to take his pre-meal Novolog and ended up taking it after the meal, he admitted to this.  Also admitted that his wife will often have to remind him to take the Novolog injections.  \par Review of systems is essentially negative for CV, resp, GI.  Has nocturia 1-2X/night.  Denies any neuropathic symptoms.\par \par Labs from 3/19 were reviewed and discussed:  \par FBS was 137 but  A1c markedly elevated at 9.6 mg%\par LDL was 92 mg%--above target of 70 mg%, and likely due to missed doses of atorvastatin\par Urine microalbumin ratio was negative at 19  (normal < 30)\par TSH level was borderline elevated at 4.98 uU/ml  (normal 0.4-4.5)\par 25-D level was in target range at 33 ng/ml\par \par Plans:\par 1) The fingerstick data does not show any discrete pattern, and as usual shows enough values in the low 100 range that I feel that it might be risky to increase his insulin.  There is also the question of overnight hypoglycemia.  Suspect that many of the extremely high daytime fingersticks are due to missed doses of pre-meal insulin--which he denies directly, but which has to be suspected given his answers to questions about his wife reminding him, etc\par --Will not change his current regimen.\par --Continue fingersticks 4X/day (AC and hs).\par --Asked him to check random 3 AM fingersticks twice a week, and also check whenever he feels like he might be hypoglycemic.  If any of these are < 80, he is to treat with 4-6 oz of OJ.  He is also to let me know the next day.\par 2) Hyperlipidemia:  To continue atorvastatin 80 mg/day\par 3) Sub-clinical hypothyroidism--a new finding, but given the pt's age, cardiac disease, cognitive deficits, would prefer not to complicate his regimen for now.  Doubt that treating this degree of TSH elevation will improve his cognitive status.  \par \par Pt to make a follow-up appointment in the office for July.  Will mail a [facility] requisition for before the visit.\par \par Duration of encounter 30 min

## 2020-07-08 NOTE — PHYSICAL EXAM
[Alert] : alert [No Acute Distress] : no acute distress [Normal Sclera/Conjunctiva] : normal sclera/conjunctiva [EOMI] : extra ocular movement intact [PERRL] : pupils equal, round and reactive to light [No Proptosis] : no proptosis [No Lid Lag] : no lid lag [No Neck Mass] : no neck mass was observed [Normal Outer Ear/Nose] : the ears and nose were normal in appearance [Thyroid Not Enlarged] : the thyroid was not enlarged [No LAD] : no lymphadenopathy [Normal Rate] : heart rate was normal [Clear to Auscultation] : lungs were clear to auscultation bilaterally [Carotids Normal] : carotid pulses were normal with no bruits [Regular Rhythm] : with a regular rhythm [Not Tender] : non-tender [No Edema] : no peripheral edema [Normal Supraclavicular Nodes] : no supraclavicular lymphadenopathy [No HSM] : no hepato-splenomegaly [Soft] : abdomen soft [Normal Anterior Cervical Nodes] : no anterior cervical lymphadenopathy [No CVA Tenderness] : no ~M costovertebral angle tenderness [No Involuntary Movements] : no involuntary movements were seen [No Joint Swelling] : no joint swelling seen [No Rash] : no rash [Normal Affect] : the affect was normal [No Tremors] : no tremors [Normal Mood] : the mood was normal [Kyphosis] : no kyphosis present [Acanthosis Nigricans] : no acanthosis nigricans [Foot Ulcers] : no foot ulcers [de-identified] : Visibly thinner [de-identified] : No corneal arcus [de-identified] : Hearing is significantly impaired despite the hearing aids [de-identified] : No murmurs [de-identified] : DP pulses 2+ on the left, non-palpable on the right [de-identified] : Quads strength 4/5 bilaterally, hip flexors 4-/5 bilaterally [de-identified] : Extremely dry.  Nails mycotic and overgrown [de-identified] : Sensation to monofilament testing nearly absent over the toes.  Vibratory sensation absent over the toes, proprio is largely intact

## 2020-07-16 ENCOUNTER — RESULT REVIEW (OUTPATIENT)
Age: 85
End: 2020-07-16

## 2020-07-16 ENCOUNTER — OUTPATIENT (OUTPATIENT)
Dept: OUTPATIENT SERVICES | Facility: HOSPITAL | Age: 85
LOS: 1 days | End: 2020-07-16

## 2020-07-16 ENCOUNTER — APPOINTMENT (OUTPATIENT)
Dept: MRI IMAGING | Facility: CLINIC | Age: 85
End: 2020-07-16
Payer: MEDICARE

## 2020-07-16 DIAGNOSIS — Z98.52 VASECTOMY STATUS: Chronic | ICD-10-CM

## 2020-07-16 DIAGNOSIS — Z98.41 CATARACT EXTRACTION STATUS, RIGHT EYE: Chronic | ICD-10-CM

## 2020-07-16 DIAGNOSIS — Z98.42 CATARACT EXTRACTION STATUS, LEFT EYE: Chronic | ICD-10-CM

## 2020-07-16 PROCEDURE — 70551 MRI BRAIN STEM W/O DYE: CPT | Mod: 26

## 2020-11-09 ENCOUNTER — APPOINTMENT (OUTPATIENT)
Dept: ENDOCRINOLOGY | Facility: CLINIC | Age: 85
End: 2020-11-09
Payer: MEDICARE

## 2020-11-09 VITALS
HEIGHT: 67 IN | WEIGHT: 147 LBS | DIASTOLIC BLOOD PRESSURE: 59 MMHG | BODY MASS INDEX: 23.07 KG/M2 | OXYGEN SATURATION: 98 % | HEART RATE: 71 BPM | SYSTOLIC BLOOD PRESSURE: 152 MMHG | TEMPERATURE: 97.7 F

## 2020-11-09 PROCEDURE — 99214 OFFICE O/P EST MOD 30 MIN: CPT

## 2020-11-10 NOTE — HISTORY OF PRESENT ILLNESS
[FreeTextEntry1] : 86-year-old man who is followed for insulin-requiring type 2 DM, hyperlipidemia and hypertension.  HIs diabetes was initially diagnosed in 1983, and he has been insulin-requiring since 1993.  His glycemic control has worsened considerably in the past few years, with a rise in A1c levels to the 8-11% range.  Barriers to improved control include the large number of meals which he eats  out of the house, as well as his worsening cognitive function.  HIs other active medical problems include IHD (s/p NSTEMI and emergency PCI/stent to the RCA in 2006), moderate dementia, and a stable abdominal aortic aneurysm.\par \antonella Came to the visit accompanied by his daughter.\par Interim history since his last visit is significant primarily for treatment of a basal cell CA on his scalp with topical 5-FU, and a pending dental implant to replace a tooth which "just fell out."\antonella Had spoken to the pt and his wife every 3-4 days after his last visit regarding fingerstick results.  Wife has started to supervise insulin injections (except for the morning injection when she is still asleep).  Insulin doses were increased to his current regimen of 28/28/26 pre-meal and 22 Lantus qhs.\par When asked today if his wife watches him give the insulin injections every time except the morning, he says "50/50."\antonella Has been testing his fingersticks 4X/day but forgot to bring his log book.  Says that his readings are "still high" (i.e. > 200, often in the 300s), but will occasionally be down to the 90s.  The lower ones do not necessarily occur at any particular time of day.  He denies any hypoglycemic symptoms with blood sugars in the 90s, and has not had any overnight hypoglycemia.  \antonella Saw Dr. Goodwin for  follow-up, and had a phone visit with Dr. Lam last month.\par Ophth exam with Dr. Carcamo was apparently stable.\antonella Resumed playing tennis (2 days/week) a few months ago.  Does not see any changes in his blood sugars on the days that he plays.\par Diet discussed:\par --Having cold oatmeal for breakfast \par --Lunch seems to be some type of salad (from [employer]) with a arianna bread\par --Eating out or doing takeout most nights for supper, occasionally cooking at home\par Is possibly having some numbness and paresthesias in his feet.

## 2020-11-10 NOTE — REVIEW OF SYSTEMS
[Dry Skin] : dry skin [Pain/Numbness of Digits] : pain/numbness of digits [Fatigue] : no fatigue [Decreased Appetite] : appetite not decreased [Recent Weight Gain (___ Lbs)] : no recent weight gain [Recent Weight Loss (___ Lbs)] : no recent weight loss [Fever] : no fever [Chills] : no chills [Dry Eyes] : no dryness [Blurred Vision] : no blurred vision [Eyes Itch] : no itch [Dysphagia] : no dysphagia [Leg Claudication] : no leg claudication [Palpitations] : no palpitations [Lower Ext Edema] : no lower extremity edema [Shortness Of Breath] : no shortness of breath [Cough] : no cough [Wheezing] : no wheezing [SOB on Exertion] : no shortness of breath on exertion [Nausea] : no nausea [Constipation] : no constipation [Heartburn] : no heartburn [Diarrhea] : no diarrhea [Polyuria] : no polyuria [Dysuria] : no dysuria [Joint Pain] : no joint pain [Muscle Weakness] : no muscle weakness [Myalgia] : no myalgia  [Joint Stiffness] : no joint stiffness [Ulcer] : no ulcer [Headaches] : no headaches [Difficulty Walking] : no difficulty walking [Tremors] : no tremors [Depression] : no depression [Anxiety] : no anxiety [Stress] : no stress [Polydipsia] : no polydipsia [Easy Bleeding] : no ~M tendency for easy bleeding [Easy Bruising] : no tendency for easy bruising [FreeTextEntry4] : Lost the hearing aid for the left ear.  Significant impairment without it [FreeTextEntry5] : Possible chest tightness when playing tennis, but does not seem to force him to stop [FreeTextEntry8] : Nocturia 1-2X/night [de-identified] : Recent treatment for a BCCA on his scalp [de-identified] : Taking 25 mg of trazodone to sleep, sometimes another 25 mg in the middle of the night if problems falling back to sleep

## 2020-11-10 NOTE — PHYSICAL EXAM
[Alert] : alert [Well Nourished] : well nourished [Healthy Appearance] : healthy appearance [Normal Sclera/Conjunctiva] : normal sclera/conjunctiva [EOMI] : extra ocular movement intact [PERRL] : pupils equal, round and reactive to light [No Proptosis] : no proptosis [No Lid Lag] : no lid lag [Normal Outer Ear/Nose] : the ears and nose were normal in appearance [No Neck Mass] : no neck mass was observed [No LAD] : no lymphadenopathy [Thyroid Not Enlarged] : the thyroid was not enlarged [No Thyroid Nodules] : no palpable thyroid nodules [Clear to Auscultation] : lungs were clear to auscultation bilaterally [No Murmurs] : no murmurs [Normal Rate] : heart rate was normal [Regular Rhythm] : with a regular rhythm [Carotids Normal] : carotid pulses were normal with no bruits [No Edema] : no peripheral edema [Not Tender] : non-tender [Soft] : abdomen soft [No HSM] : no hepato-splenomegaly [Normal Supraclavicular Nodes] : no supraclavicular lymphadenopathy [Normal Anterior Cervical Nodes] : no anterior cervical lymphadenopathy [Normal Gait] : normal gait [No Involuntary Movements] : no involuntary movements were seen [No Joint Swelling] : no joint swelling seen [Normal Strength/Tone] : muscle strength and tone were normal [No Tremors] : no tremors [Normal Sensation on Monofilament Testing] : normal sensation on monofilament testing of lower extremities [Normal Affect] : the affect was normal [Normal Mood] : the mood was normal [Kyphosis] : no kyphosis present [Acanthosis Nigricans] : no acanthosis nigricans [Foot Ulcers] : no foot ulcers [de-identified] : No corneal arcus or xanthelasma [de-identified] : Hearing is markedly impaired even with a hearing aid in the right ear [de-identified] : DP pulses 2+ on the left, non-palpable (with ? slightly delayed capillary refill) on the right [de-identified] : Areas of lipohypertrophy on either side of the umbilicus [de-identified] : Toenails overgrown and mycotic.  Mildly scaly area on the vertex of his scalp from previous BCC.  Significant male-pattern hair loss [de-identified] : Vibratory sensation moderately decreased over the toes

## 2020-11-10 NOTE — ASSESSMENT
[FreeTextEntry1] : 1) Type 2 DM:  Glycemic control has obviously worsened despite attempts to have his wife supervise his injections.  Although he did not bring his log book, he admits that nearly all of his blood sugars have been in the 200-400 range.  He also describes his wife's supervision (which is supposed to include actually checking that he has drawn up the insulin correctly and watching him give the injection) as "50-50."  Non-compliance with his insulin regimen is almost certainly a major contributor to the hyperglycemia.  An additional factor could also be impaired insulin absorption secondary to his injecting into lipohypertrophic areas.\par Dietary lapses seem to be less of an issue despite the frequency of restaurant and takeout meals\par --Instructed him to avoid the lipohypertrophic areas on his abdominal wall, demonstrated alternative sites, and gave him (and his daughter) a diagram\par --Will not increase his insulin doses at this point given the suspected non-compliance\par --He is to continue testing fingersticks 4X/day and logging results\par --Will contact his pharmacy to check on whether he has been filling prescriptions for insulin appropriately.\par \par 2) Hypercholesterolemia:  LDL-cholesterol is above his target of 70 mg%, and higher than many of his previous values.  Suspect missed doses of atorvastatin as well.\par --Will continue atorvastatin 80 mg/day for now.  \par --Will possibly add Zetia to his regimen, but would want to keep the number of meds to a minimum\par \par 3) Hypertension:  Systolic BP is somewhat elevated today.  Pt blames "rushing" to the office.  Given the low diastolic BP, however, will hold off increasing the enalapril or metoprolol.\par \par As a follow-up to the visit, I checked with [facility] regarding his prescription fills.  He received 3 vials of Novolog in July, 1 vial in Octobe, and is supposed to  additional vials today.  At a total Novolog dose of 75 units/day, he should have required approximately 9 vials since July.\par \par Will speak to his wife and daughter about the situation.\par See for follow-up in 4 months.  CMP, lipids, A1c, microalb, TFTs, CBC before the visit\par Needs to see a podiatrist--ref[address] to Dr. Das on [address] [FreeTextEntry2] : Injection sites

## 2020-11-10 NOTE — DATA REVIEWED
[FreeTextEntry1] : [facility]  (11/2/20)\par \par ,  A1c 11.1%\par CMP WNL\par LDL 99, HDL 64, TG 67\par \par

## 2021-01-26 RX ORDER — HYDROCHLOROTHIAZIDE 12.5 MG/1
12.5 CAPSULE ORAL
Qty: 30 | Refills: 11 | Status: ACTIVE | COMMUNITY
Start: 2018-02-02 | End: 1900-01-01

## 2021-03-08 ENCOUNTER — APPOINTMENT (OUTPATIENT)
Dept: ENDOCRINOLOGY | Facility: CLINIC | Age: 86
End: 2021-03-08
Payer: MEDICARE

## 2021-03-08 VITALS
SYSTOLIC BLOOD PRESSURE: 142 MMHG | WEIGHT: 143 LBS | OXYGEN SATURATION: 98 % | DIASTOLIC BLOOD PRESSURE: 69 MMHG | HEART RATE: 70 BPM | BODY MASS INDEX: 22.44 KG/M2 | TEMPERATURE: 97.8 F | HEIGHT: 67 IN

## 2021-03-08 PROCEDURE — 99214 OFFICE O/P EST MOD 30 MIN: CPT

## 2021-03-10 NOTE — PHYSICAL EXAM
[Alert] : alert [No Acute Distress] : no acute distress [Normal Sclera/Conjunctiva] : normal sclera/conjunctiva [EOMI] : extra ocular movement intact [PERRL] : pupils equal, round and reactive to light [No Proptosis] : no proptosis [No Lid Lag] : no lid lag [Normal Outer Ear/Nose] : the ears and nose were normal in appearance [No Neck Mass] : no neck mass was observed [No LAD] : no lymphadenopathy [Thyroid Not Enlarged] : the thyroid was not enlarged [No Thyroid Nodules] : no palpable thyroid nodules [Clear to Auscultation] : lungs were clear to auscultation bilaterally [No Murmurs] : no murmurs [Normal Rate] : heart rate was normal [Regular Rhythm] : with a regular rhythm [Carotids Normal] : carotid pulses were normal with no bruits [No Edema] : no peripheral edema [Not Tender] : non-tender [Soft] : abdomen soft [No HSM] : no hepato-splenomegaly [Normal Supraclavicular Nodes] : no supraclavicular lymphadenopathy [Normal Anterior Cervical Nodes] : no anterior cervical lymphadenopathy [No CVA Tenderness] : no ~M costovertebral angle tenderness [Normal Gait] : normal gait [No Involuntary Movements] : no involuntary movements were seen [No Joint Swelling] : no joint swelling seen [No Tremors] : no tremors [Normal Sensation on Monofilament Testing] : normal sensation on monofilament testing of lower extremities [Normal Affect] : the affect was normal [Normal Mood] : the mood was normal [Kyphosis] : no kyphosis present [Acanthosis Nigricans] : no acanthosis nigricans [Foot Ulcers] : no foot ulcers [de-identified] : Still noticeably thinner [de-identified] : No corneal arcus or xanthelasma [de-identified] : Hearing is somewhat impaired despite the hearing aids [de-identified] : DP pulses 2+ on the left, non-palpable (with slightly delayed capillary refill) on the right [de-identified] : Prominent area of lipohypertrophy to the right of the umbilicus.  Less prominent area on the left [de-identified] : Male-pattern hair loss.  Toenails significantly mycotic [de-identified] : Vibratory sensation significantly decreased over the toes

## 2021-03-10 NOTE — REVIEW OF SYSTEMS
[Fatigue] : fatigue [Hearing Loss] : hearing loss [Dry Skin] : dry skin [Insomnia] : insomnia [Fever] : no fever [Chills] : no chills [Dry Eyes] : no dryness [Blurred Vision] : no blurred vision [Eyes Itch] : no itch [Dysphagia] : no dysphagia [Chest Pain] : no chest pain [Palpitations] : no palpitations [Lower Ext Edema] : no lower extremity edema [Shortness Of Breath] : no shortness of breath [Cough] : no cough [Wheezing] : no wheezing [SOB on Exertion] : no shortness of breath on exertion [Nausea] : no nausea [Constipation] : no constipation [Heartburn] : no heartburn [Diarrhea] : no diarrhea [Polyuria] : no polyuria [Dysuria] : no dysuria [Joint Pain] : no joint pain [Muscle Weakness] : no muscle weakness [Myalgia] : no myalgia  [Joint Stiffness] : no joint stiffness [Ulcer] : no ulcer [Headaches] : no headaches [Tremors] : no tremors [Pain/Numbness of Digits] : no pain/numbness of digits [Depression] : no depression [Stress] : no stress [Polydipsia] : no polydipsia [Easy Bleeding] : no ~M tendency for easy bleeding [Easy Bruising] : no tendency for easy bruising [FreeTextEntry2] : Has lost 2 lb overall since his last visit.  Appetite is increased at this point. [FreeTextEntry4] : Has been wearing his hearing aids [FreeTextEntry8] : Nocturia 2X/night

## 2021-03-10 NOTE — HISTORY OF PRESENT ILLNESS
[FreeTextEntry1] : 87-year-old man who is followed for insulin-requiring type 2 DM, hyperlipidemia and hypertension.  HIs diabetes was initially diagnosed in 1983, and he has been insulin-requiring since 1993.  His glycemic control has worsened considerably in the past few years, with a rise in A1c levels into the 8-11% range.  Barriers to improved control include the large number of meals which he eats  out of the house, as well as his worsening cognitive function.  HIs other active medical problems include IHD (s/p NSTEMI and emergency PCI/stent to the RCA in 2006), moderate dementia, and a stable abdominal aortic aneurysm.\par \par Came to the visit accompanied by his daughter, who had called me last month to report that the pt's blood sugars had been consistently over 300 mg%, with some likely over 500 mg%.  She also said that he had been drowsy during the day, falling asleep in the middle of the afternoon quite frequently.  Because I was suspicious about the consistency of his insulin administration, I checked with the pharmacy regarding refills, and he clearly was not filling prescriptions on time.  Most importantly, he had not filled any prescriptions for Lantus since last July.\par I then called his wife, asked her if she was in fact still supervising his injections, and she admitted that she was not doing this consistently.  I emphasized that she needed to observe as many as possible, including checking the syringe, and actually watching him give the injection.\par He has continued testing fingersticks 4X/day and brought a log of his readings.  Most of them are still in the 250-400 range, occasionally over 400, but also with occasional values in the low 100 range.  (Was 101 at bedtime last night, after a supper with vegetables, rice and beans).\par His weight is down 2 lb from his last visit, but he has likely started gaining back some weight over the last 2-3 weeks.\par He has been somewhat less sleepy in the afternoon during the past 2 weeks.\par He still complains of dry mouth, but seems to be drinking mostly water.\par Current diet:\par --Breakfast is uncooked oatmeal with milk and blueberries\par --Lunch is prepared food from [employer], often salads (with arianna bread)\par --Supper is sometimes cooked at home, sometimes takeout food from [employer]\antonella Saw Dr. Das for Podiatry evaluation after his last visit, and has another appointment scheduled for later this week\antonella Sees Dr. Carcamo for ophth exam every 4 months.\antonella Denies any numbness or paresthesias in his feet.

## 2021-03-10 NOTE — ASSESSMENT
[FreeTextEntry1] : 1) Type 2 DM:  Glycemic control has been extremely poor, but may have improved somewhat in the past 2 weeks after I spoke to his wife.  Although this is of course a burden on her, there seems to be no choice but to have all of his injections supervised and verified visually.  \par --Despite previous instructions, the patient continues to do many of his injections (if not all) into the llipohypertrophic area on his abdominal wall.  Re-instructed him, demonstrated alternate sites, and gave his daughter a printed diagram.  She will discuss this with her mother.\par --Will increase the insulin, but only slightly--Lantus to 34 units, pre-meal lispro to 30 units for all 3 meals\par --Additional options would be to decrease the number of injections by using 70/30 insulin, but this would be a major compromise given the large doses of meal-related insulin he is taking relative to basal.\par \par 2) Hyperlipidemia:  LDL-cholesterol is well above his target of 70 mg%, and distinctly higher than his usual values.  The cause is almost certainly missed doses of atorvastatin.\par --Discussed compliance with the pt and his daughter.  Suggested that he take it with supper (when his wife can remind him), and if no success with this, to take it in the morning\par \par 3) Hypertension:  Systolic BP is a trace above target level at today's exam.\par --Continue combination therapy with HCTZ, enalapril and metoprolol\par \par 4) Sub-clinical hypothyroidism:  TSH level has decreased into the mid-normal range.  No indication for thyroxine replacement at this point.  Expect that the TSH will continue to fluctuate.\par \par See for follow-up in 2-3 months.  CMP, lipids, A1c, TFTs, microalb, CBC before the visit [FreeTextEntry2] : Injection sites,

## 2021-03-10 NOTE — DATA REVIEWED
[FreeTextEntry1] : [facility]  (3/3/21)\par \par ,  A1c 13.3%\par CMP WNL\par Urine microalbumin ratio positive at 67 (normal < 30)\par , HDL 68, \par TSH level normal at 3.26 uU/ml  (0.4-4.5)\par 25-D level excellent at 46 ng/ml

## 2021-03-30 RX ORDER — TRAZODONE HYDROCHLORIDE 50 MG/1
50 TABLET ORAL
Qty: 90 | Refills: 1 | Status: ACTIVE | COMMUNITY
Start: 2017-07-26 | End: 1900-01-01

## 2021-05-10 ENCOUNTER — APPOINTMENT (OUTPATIENT)
Dept: ENDOCRINOLOGY | Facility: CLINIC | Age: 86
End: 2021-05-10
Payer: MEDICARE

## 2021-05-10 VITALS
DIASTOLIC BLOOD PRESSURE: 74 MMHG | HEART RATE: 70 BPM | HEIGHT: 67 IN | OXYGEN SATURATION: 98 % | TEMPERATURE: 97.3 F | SYSTOLIC BLOOD PRESSURE: 158 MMHG | BODY MASS INDEX: 23.39 KG/M2 | WEIGHT: 149 LBS

## 2021-05-10 PROCEDURE — 99214 OFFICE O/P EST MOD 30 MIN: CPT

## 2021-05-12 NOTE — REVIEW OF SYSTEMS
[Hair Loss] : hair loss [Pain/Numbness of Digits] : pain/numbness of digits [Polydipsia] : polydipsia [Fatigue] : no fatigue [Decreased Appetite] : appetite not decreased [Fever] : no fever [Chills] : no chills [Dry Eyes] : no dryness [Blurred Vision] : no blurred vision [Eyes Itch] : no itch [Dysphagia] : no dysphagia [Hearing Loss] : no hearing loss  [Chest Pain] : no chest pain [Palpitations] : no palpitations [Lower Ext Edema] : no lower extremity edema [Cough] : no cough [Shortness Of Breath] : no shortness of breath [Wheezing] : no wheezing [SOB on Exertion] : no shortness of breath on exertion [Nausea] : no nausea [Constipation] : no constipation [Heartburn] : no heartburn [Diarrhea] : no diarrhea [Polyuria] : no polyuria [Dysuria] : no dysuria [Joint Pain] : no joint pain [Muscle Weakness] : no muscle weakness [Myalgia] : no myalgia  [Joint Stiffness] : no joint stiffness [Dry Skin] : no dry skin [Ulcer] : no ulcer [Headaches] : no headaches [Difficulty Walking] : no difficulty walking [Tremors] : no tremors [Depression] : no depression [Anxiety] : no anxiety [Stress] : no stress [Cold Intolerance] : no cold intolerance [Heat Intolerance] : no heat intolerance [Easy Bleeding] : no ~M tendency for easy bleeding [Easy Bruising] : no tendency for easy bruising [FreeTextEntry2] : Puckett gained 6 lb since his last visit [FreeTextEntry8] : Nocturia 1-2X/night

## 2021-05-12 NOTE — ASSESSMENT
[FreeTextEntry1] : 1) Type 2 DM:\par --To continue Lantus 34 units qhs\par --As a precaution, decrease Novolog to 28 units before each meal\par --Was again warned about avoiding injections into the lipohypertrophic areas of his abdomen\par \par 2) Hypercholesterolemia:\par \par 3) Hypertension:\par --Needs to start monitoring BPs at home\par \par \par

## 2021-05-12 NOTE — HISTORY OF PRESENT ILLNESS
[FreeTextEntry1] : 87-year-old man who is followed for insulin-requiring type 2 DM, hyperlipidemia and hypertension.  HIs diabetes was initially diagnosed in 1983, and he has been insulin-requiring since 1993.  His glycemic control has worsened considerably in the past few years, with a rise in A1c levels into the 8-11% range.  Barriers to improved control include the large number of meals which he eats  out of the house, as well as his worsening cognitive function.  HIs other active medical problems include IHD (s/p NSTEMI and emergency PCI/stent to the RCA in 2006), moderate dementia, and a stable abdominal aortic aneurysm.\antonella \antonella Once again, came to the visit accompanied by his daughter.\antonella Has been testing fingersticks 4X/day and brought a written log of his readings.  Most of the fingersticks are in the 300-450 range, but he has intermittent hypoglycemia which is unexplained.  \antonella Has been taking 34 units of Lantus qhs, also taking 34 units pre-meal (though the instructions were for 30 units).\par --Breakfast is still uncooked rolled oats with milk, blueberries\par --Lunch is variable--but mostly prepared food from [employer]\par --Eats out for supper at least 5 nights a week.  \par --Is vague about whether the restaurant meals include dessert.\antonella --Has been having one or two glasses of wine with supper\antonella Has not been playing tennis or swimming.\par Although his wife was supposed to be supervising his insulin, she has apparently stopped doing this with the pre-breakfast dose, and also missed several bedtime Lantus doses.\antonella Still seems to be frequently injecting into the lipohypertrophic areas of his abdominal wall.\antonella Has been having more subjective numbness in his toes\par Ophtho exam with Dr. Carcamo in January was apparently negative for retinopathy.\antonella Pt's family is now forbidding the pt to go out alone.  He has apparently gotten lost a few times, even in the neighborhood

## 2021-05-12 NOTE — DATA REVIEWED
[FreeTextEntry1] : [facility]  (4/27/21)\par \par ,  A1c 11.0%\par CMP WNL--(Creatinine borderline elevated at 1.18 mg%)\par Urine microalb ratio negative at 24  (normal < 30)\par , HDL 72, TG 98\par

## 2021-05-12 NOTE — PHYSICAL EXAM
[Alert] : alert [No Acute Distress] : no acute distress [Normal Sclera/Conjunctiva] : normal sclera/conjunctiva [EOMI] : extra ocular movement intact [PERRL] : pupils equal, round and reactive to light [No Proptosis] : no proptosis [No Lid Lag] : no lid lag [Normal Outer Ear/Nose] : the ears and nose were normal in appearance [No Neck Mass] : no neck mass was observed [No LAD] : no lymphadenopathy [Thyroid Not Enlarged] : the thyroid was not enlarged [No Thyroid Nodules] : no palpable thyroid nodules [Clear to Auscultation] : lungs were clear to auscultation bilaterally [No Murmurs] : no murmurs [Normal Rate] : heart rate was normal [Regular Rhythm] : with a regular rhythm [Carotids Normal] : carotid pulses were normal with no bruits [No Edema] : no peripheral edema [Not Tender] : non-tender [Soft] : abdomen soft [No HSM] : no hepato-splenomegaly [Normal Supraclavicular Nodes] : no supraclavicular lymphadenopathy [Normal Anterior Cervical Nodes] : no anterior cervical lymphadenopathy [No CVA Tenderness] : no ~M costovertebral angle tenderness [Normal Gait] : normal gait [No Involuntary Movements] : no involuntary movements were seen [No Joint Swelling] : no joint swelling seen [No Tremors] : no tremors [Normal Affect] : the affect was normal [Normal Mood] : the mood was normal [Kyphosis] : no kyphosis present [Acanthosis Nigricans] : no acanthosis nigricans [Foot Ulcers] : no foot ulcers [de-identified] : Still appears younger than his stated age [de-identified] : No corneal arcus [de-identified] : Hearing is moderately impaired even with hearing aids [de-identified] : DP pulses 2+ on the left, non-palpable (but with normal capillary refill) on the right [de-identified] : Appears to have mild proximal leg weakness [de-identified] : Areas of lipohyptrophy on either side of the umbilicus, much more prominent on the right [de-identified] : Mycotic toenails.  Significant male-pattern hair loss [de-identified] : Patchy loss of sensation to monofilament testing over the toes

## 2021-07-28 ENCOUNTER — APPOINTMENT (OUTPATIENT)
Dept: ENDOCRINOLOGY | Facility: CLINIC | Age: 86
End: 2021-07-28
Payer: MEDICARE

## 2021-07-28 VITALS
HEIGHT: 68 IN | BODY MASS INDEX: 24.86 KG/M2 | OXYGEN SATURATION: 99 % | WEIGHT: 164 LBS | DIASTOLIC BLOOD PRESSURE: 65 MMHG | HEART RATE: 65 BPM | SYSTOLIC BLOOD PRESSURE: 140 MMHG | TEMPERATURE: 97.7 F

## 2021-07-28 PROCEDURE — 99214 OFFICE O/P EST MOD 30 MIN: CPT

## 2021-08-01 NOTE — DATA REVIEWED
[FreeTextEntry1] : [facility]  (7/13/21)\par \par ,  A1c 9.0%\par CMP--Creatinine 1.25 mg%, eGFR 51\par LDL 71, HDL 64, \par Urine microalbumin ratio positive at 82 (normal < 30)\par TSH level 3.26 uU/ml  (0.4-4.5)\par CBC WNL\par

## 2021-08-01 NOTE — HISTORY OF PRESENT ILLNESS
[FreeTextEntry1] : 87-year-old man who is followed for insulin-requiring type 2 DM, hyperlipidemia and hypertension.  HIs diabetes was initially diagnosed in 1983, and he has been insulin-requiring since 1993.  His glycemic control has worsened considerably in the past few years, with a rise in A1c levels into the 9-12% range.  Although the main barrier to improved control was previously the large number of meals which he ate out of the house, the main problem during the past 2 years has been his worsening dementia, which has resulted in numerous missed insulin doses.  His wife is now supervising his insulin injections, but not as consistently as is necessary.  \par HIs other active medical problems include IHD (s/p NSTEMI and emergency PCI/stent to the RCA in 2006), moderate dementia, and a stable abdominal aortic aneurysm.\par \par As usual, he came to the visit accompanied by his daughter.\par Glucoses have been better--almost certainly because his wife has been more consistent in supervising injections.  \par Testing fingersticks 3-4X/day and brought his usual written log. The numbers remain quite variable, with many still > 300, juwan after supper.  The spikes after supper will generally carry over until the next morning.  Interspersed with the spikes in the morning will be occasional values which are down to the 80-90 mg% range.  \par Taking 28 units of Novoog pre-meal, and 34 units Lantus at bedtime.\antonella Is still eating out every night for supper. but has also now been eating out for lunch more often (salads at [employer]).  Will usually bring his insulin with him to take before he eats, but does not necessarily do a fingerstick before the meal.\par He remains frustrated by the restrictions which have been placed on him, especially his inability to play tennis, since it is clearly not safe for [place] to [place] the subway [place].

## 2021-08-01 NOTE — PHYSICAL EXAM
[Alert] : alert [No Acute Distress] : no acute distress [Normal Sclera/Conjunctiva] : normal sclera/conjunctiva [EOMI] : extra ocular movement intact [PERRL] : pupils equal, round and reactive to light [No Proptosis] : no proptosis [No Lid Lag] : no lid lag [Normal Outer Ear/Nose] : the ears and nose were normal in appearance [No Neck Mass] : no neck mass was observed [No LAD] : no lymphadenopathy [Thyroid Not Enlarged] : the thyroid was not enlarged [No Thyroid Nodules] : no palpable thyroid nodules [Clear to Auscultation] : lungs were clear to auscultation bilaterally [No Murmurs] : no murmurs [Normal Rate] : heart rate was normal [Regular Rhythm] : with a regular rhythm [Carotids Normal] : carotid pulses were normal with no bruits [No Edema] : no peripheral edema [Not Tender] : non-tender [Soft] : abdomen soft [No HSM] : no hepato-splenomegaly [Normal Supraclavicular Nodes] : no supraclavicular lymphadenopathy [Normal Anterior Cervical Nodes] : no anterior cervical lymphadenopathy [No CVA Tenderness] : no ~M costovertebral angle tenderness [Normal Gait] : normal gait [No Involuntary Movements] : no involuntary movements were seen [No Joint Swelling] : no joint swelling seen [No Rash] : no rash [No Tremors] : no tremors [Normal Affect] : the affect was normal [Normal Mood] : the mood was normal [Kyphosis] : no kyphosis present [Acanthosis Nigricans] : no acanthosis nigricans [Foot Ulcers] : no foot ulcers [Right Foot Was Examined] : right foot ~C was examined [Left Foot Was Examined] : left foot ~C was examined [Delayed in the Right Toes] : normal in the toes [1+] : 1+ in the posterior tibialis [Normal] : normal [Delayed in the Left Toes] : normal in the toes [0] : 0 in the posterior tibialis [2+] : 2+ in the dorsalis pedis [Vibration Dec.] : diminished vibratory sensation at the level of the toes [Position Sense Dec.] : normal position sense at the level of the toes [#1 Diminished] : number 1 was diminished [#2 Diminished] : number 2 was diminished [#3 Diminished] : number 3 was diminished [#4 Diminished] : number 4 was normal [#5 Diminished] : number 5 was normal [#6 Diminished] : number 6 was normal [#7 Diminished] : number 7 was normal [#8 Diminished] : number 8 was normal [#9 Diminished] : number 9 was normal [#10 Diminished] : number 10 was normal [de-identified] : Now appears considerably healthier and better nourished having gained back much of his lost weight [de-identified] : No corneal arcus [de-identified] : Hearing is significantly impaired despite the hearing aids, possibly because the batteries have  [de-identified] : DP pulses 2+ on the left, non-palpable (but with normal capillary refill) on the right [de-identified] : Areas of lipohypertrophy on either side of the umbilicus (more prominent on the right) are unchanged [de-identified] : Appears to have mild proximal leg weakness [de-identified] : Mycotic toenails. [de-identified] : Patchy loss of sensation to monofilament testing over the toes

## 2021-08-01 NOTE — REVIEW OF SYSTEMS
[Hair Loss] : hair loss [Fatigue] : no fatigue [Decreased Appetite] : appetite not decreased [Fever] : no fever [Chills] : no chills [Dry Eyes] : no dryness [Blurred Vision] : no blurred vision [Eyes Itch] : no itch [Dysphagia] : no dysphagia [Chest Pain] : no chest pain [Palpitations] : no palpitations [Lower Ext Edema] : no lower extremity edema [Shortness Of Breath] : no shortness of breath [Cough] : no cough [Wheezing] : no wheezing [SOB on Exertion] : no shortness of breath on exertion [Nausea] : no nausea [Constipation] : no constipation [Heartburn] : no heartburn [Diarrhea] : no diarrhea [Polyuria] : no polyuria [Dysuria] : no dysuria [Joint Pain] : no joint pain [Muscle Weakness] : no muscle weakness [Myalgia] : no myalgia  [Joint Stiffness] : no joint stiffness [Dry Skin] : no dry skin [Ulcer] : no ulcer [Headaches] : no headaches [Difficulty Walking] : no difficulty walking [Tremors] : no tremors [Pain/Numbness of Digits] : no pain/numbness of digits [Depression] : no depression [Anxiety] : no anxiety [Stress] : no stress [Cold Intolerance] : no cold intolerance [Heat Intolerance] : no heat intolerance [Easy Bleeding] : no ~M tendency for easy bleeding [Easy Bruising] : no tendency for easy bruising [FreeTextEntry2] : Puckett gained 15 lb since his last visit [FreeTextEntry4] : Hearing aids have been working satisfactorily most of the time [FreeTextEntry8] : Nocturia 1-2X/night [de-identified] : Seems to be less thirsty than before

## 2021-08-15 ENCOUNTER — EMERGENCY (EMERGENCY)
Facility: HOSPITAL | Age: 86
LOS: 1 days | Discharge: HOME | End: 2021-08-15
Admitting: EMERGENCY MEDICINE
Payer: MEDICARE

## 2021-08-15 VITALS
TEMPERATURE: 98 F | RESPIRATION RATE: 18 BRPM | DIASTOLIC BLOOD PRESSURE: 90 MMHG | SYSTOLIC BLOOD PRESSURE: 148 MMHG | HEIGHT: 68 IN | WEIGHT: 160.06 LBS | OXYGEN SATURATION: 96 % | HEART RATE: 80 BPM
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
## 2021-08-15 DIAGNOSIS — T16.1XXA FOREIGN BODY IN RIGHT EAR, INITIAL ENCOUNTER: ICD-10-CM

## 2021-08-15 DIAGNOSIS — Z98.42 CATARACT EXTRACTION STATUS, LEFT EYE: Chronic | ICD-10-CM

## 2021-08-15 DIAGNOSIS — Z98.41 CATARACT EXTRACTION STATUS, RIGHT EYE: Chronic | ICD-10-CM

## 2021-08-15 DIAGNOSIS — Z97.4 PRESENCE OF EXTERNAL HEARING-AID: ICD-10-CM
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
## 2021-08-15 DIAGNOSIS — Z79.82 LONG TERM (CURRENT) USE OF ASPIRIN: ICD-10-CM

## 2021-08-15 DIAGNOSIS — Z98.52 VASECTOMY STATUS: Chronic | ICD-10-CM

## 2021-08-15 DIAGNOSIS — H61.21 IMPACTED CERUMEN, RIGHT EAR: ICD-10-CM

## 2021-08-15 PROCEDURE — 99283 EMERGENCY DEPT VISIT LOW MDM: CPT

## 2021-08-15 NOTE — ED PROVIDER NOTE - PHYSICAL EXAMINATION
CONSTITUTIONAL: Well-appearing; well-nourished; in no apparent distress.   	HEAD: Normocephalic; atraumatic.   	right ear with battery in ear canal. fb successfully removed. +cerumen impaction.    	NEURO: A & O x 3; face symmetric; grossly unremarkable.   PSYCHOLOGICAL: The patient’s mood and manner are appropriate.

## 2021-08-15 NOTE — ED PROVIDER NOTE - OBJECTIVE STATEMENT
86 yo male c/o foreign body to right ear, thinks its a battery to hearing aid, has been there for a few weeks.

## 2021-08-15 NOTE — ED PROVIDER NOTE - CARE PROVIDER_API CALL
Hector Benavides)  Otolaryngology  [address] [facility], [address] [place] [address]  Phone: [phone]  Fax: [phone]  Follow Up Time:

## 2021-08-27 NOTE — ED PROCEDURE NOTE - PROCEDURE ADDITIONAL DETAILS
fb successfully removed with no complications. ear canal examined with otoscope afterwards, +cerumen impaction, no signs of TM perforation

## 2021-11-12 ENCOUNTER — APPOINTMENT (OUTPATIENT)
Dept: ENDOCRINOLOGY | Facility: CLINIC | Age: 86
End: 2021-11-12
Payer: MEDICARE

## 2021-11-12 VITALS
BODY MASS INDEX: 25.46 KG/M2 | WEIGHT: 168 LBS | HEIGHT: 68 IN | HEART RATE: 72 BPM | SYSTOLIC BLOOD PRESSURE: 162 MMHG | OXYGEN SATURATION: 94 % | TEMPERATURE: 97.6 F | DIASTOLIC BLOOD PRESSURE: 76 MMHG

## 2021-11-12 DIAGNOSIS — M76.30 ILIOTIBIAL BAND SYNDROME, UNSPECIFIED LEG: ICD-10-CM

## 2021-11-12 DIAGNOSIS — F01.50 VASCULAR DEMENTIA W/OUT BEHAVIORAL DISTURBANCE: ICD-10-CM

## 2021-11-12 DIAGNOSIS — Z86.59 PERSONAL HISTORY OF OTHER MENTAL AND BEHAVIORAL DISORDERS: ICD-10-CM

## 2021-11-12 PROCEDURE — 99214 OFFICE O/P EST MOD 30 MIN: CPT

## 2021-11-12 RX ORDER — SYRING-NEEDL,DISP,INSUL,0.3 ML 31GX15/64"
31G X 15/64" SYRINGE, EMPTY DISPOSABLE MISCELLANEOUS
Qty: 100 | Refills: 11 | Status: DISCONTINUED | COMMUNITY
Start: 2017-08-01 | End: 2021-11-12

## 2021-11-13 PROBLEM — Z86.59 HISTORY OF DEMENTIA: Status: RESOLVED | Noted: 2017-07-26 | Resolved: 2021-11-13

## 2021-11-13 PROBLEM — M76.30 IT BAND SYNDROME: Status: RESOLVED | Noted: 2018-06-01 | Resolved: 2021-11-13

## 2021-11-13 PROBLEM — F01.50 VASCULAR DEMENTIA: Status: ACTIVE | Noted: 2021-11-13

## 2021-11-13 NOTE — HISTORY OF PRESENT ILLNESS
[FreeTextEntry1] : 88-year-old man who is followed for insulin-requiring type 2 DM, hyperlipidemia and hypertension.  HIs diabetes was initially diagnosed in 1983, and he has been insulin-requiring since 1993.  His glycemic control has worsened considerably in the past few years, with a rise in A1c levels into the 9-12% range.  Although the main barrier to improved control was previously the large number of meals which he ate out of the house, the main problem during the past 2 years has been his worsening dementia, which has resulted in numerous missed insulin doses.  His wife is now supervising his insulin injections, but not as consistently as  necessary.  \par HIs other active medical problems include IHD (s/p NSTEMI and emergency PCI/stent to the RCA in 2006), moderate dementia, and a stable abdominal aortic aneurysm.\par \par As usual, he came to the visit accompanied by his daughter.\par Glucoses have been better--probably because of more supervision of the injections by his wife--(even the pre-breakfast shots, since he has now been sleeping later and his wife is awake by then)\par Taking 34 units Lantus qhs, pre-meal Novolog 28 units.\par Diet reviewed:\par --Breakfast is rolled oats with berries\par --Lunch is a salad from [employer], sometimes with some type of added protein, plus one piece of arianna bread.\par --Suppers are still restaurant meals--[place], [facility]  (Tends to order fish twice a week).  Has dessert most nights.  (Has been more consistent in testing his fingerstick before he leaves the apartment, bringing his insulin with him and taking it just before he eats)\par Testing fingersticks 3-4X/day and brought a log of his readings.  Most of the pre-breakfast values are in the 200 range, though they are above 300 mg% at least twice a week on average.  Most of them are higher than the previous night's bedtime value, though he has also been snacking in the middle of the night on occasion--(says "if I feel hungry").  Snack is usually fruit--otherwise "whatever I can find"\par Pre-lunch fingersticks tend to be high as well, usually "tracking" the morning value\par Pre and post-supper fingersticks are widely variable, with the fluctuations in his post-supper readings likely the result of his restaurant meals\antonella Was seen at the [facility] for evaluation of his dementia last month.  No medications were prescribed.  (Per his daughter, the physician who saw him agreed that he likely has a vascular dementia)\antonella Had his COVID booster last month--did not have any reaction afterward\antonella Has started going back to the [facility] to swim.  Does this twice a week.  He is apparently enjoying this immensely, as he otherwise gets very little exercise.

## 2021-11-13 NOTE — ASSESSMENT
[FreeTextEntry1] : 1) Type 2 DM:  A1c level remains well above goal, but his overall control has improved considerably over the last two visits--almost certainly due to better supervision of his injections (and ?? diet) by his wife.  (The fact that she is now able to supervise many of his morning injections is likely a significant factor).  His diet at breakfast and lunch is relatively stable, so would nonetheless expect better and more stable glucoses before lunch and supper--the reason that control is not better is unclear.  Supper remains a problem due to the restaurant meals (and likely the desserts).\par --Will continue the current regimen for now.  Need to be cautious regarding further increases given the high doses of insulin and the intermittent readings which can be down near 100 mg%\par --Continue fingersticks 3-4X/day\par --Was again warned against injecting into the lipohypertrophic areas of his abdominal wall\par --Although his rising blood sugars overnight would make it appear that his Lantus dose needs to be increased, his morning numbers are almost certainly affected by his snacking in the middle of the night.  More importantly, there would be concern about whether his hunger is actually a hypoglycemic symptom.  Have asked him to check his fingerstick on two occasions when he wakes up feeling hungry\par \par 2) Subclinical hypothyroidism:  TSH level is well into the normal range on the current labwork.\par --Continue to follow with serial TFTs\par \par 3) Hypercholesterolemia:  LDL-cholesterol level is below his target of 70 mg%.\par --Continue atorvastatin 80 mg/day\par \par 4) Hypertension:  BP is only slightly elevated at today's visit.\par --Continue combination therapy with enalapril, HCTZ and metoprolol\par \par 5) CKD stage 2:  Creatinine level is stable.  Urine microalbumin ratio is mildly positive at 42\par \par 6) Dementia:  Cognitive impairment appears stable at this point.  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  CMP, lipids, A1c, TFTs, microalbumin, CBC before the visit [FreeTextEntry2] : 3AM fingerstick testing, injection sites

## 2021-11-13 NOTE — REVIEW OF SYSTEMS
[Hair Loss] : hair loss [Difficulty Walking] : difficulty walking [Poor Balance] : poor balance [Insomnia] : insomnia [Fatigue] : no fatigue [Decreased Appetite] : appetite not decreased [Fever] : no fever [Chills] : no chills [Dry Eyes] : no dryness [Blurred Vision] : no blurred vision [Eyes Itch] : no itch [Dysphagia] : no dysphagia [Chest Pain] : no chest pain [Palpitations] : no palpitations [Lower Ext Edema] : no lower extremity edema [Shortness Of Breath] : no shortness of breath [Cough] : no cough [Wheezing] : no wheezing [SOB on Exertion] : no shortness of breath on exertion [Nausea] : no nausea [Constipation] : no constipation [Heartburn] : no heartburn [Diarrhea] : no diarrhea [Polyuria] : no polyuria [Dysuria] : no dysuria [Joint Pain] : no joint pain [Muscle Weakness] : no muscle weakness [Myalgia] : no myalgia  [Joint Stiffness] : no joint stiffness [Dry Skin] : no dry skin [Ulcer] : no ulcer [Headaches] : no headaches [Tremors] : no tremors [Pain/Numbness of Digits] : no pain/numbness of digits [Depression] : no depression [Anxiety] : no anxiety [Stress] : no stress [Cold Intolerance] : no cold intolerance [Heat Intolerance] : no heat intolerance [Easy Bleeding] : no ~M tendency for easy bleeding [Easy Bruising] : no tendency for easy bruising [FreeTextEntry2] : Has gained another 4 lb since his last visit [FreeTextEntry4] : Hearing aids have been working satisfactorily most of the time [FreeTextEntry8] : Nocturia 1-2X/night [de-identified] : Has had several keratoses on his scalp treated with liquid nitrogen [de-identified] : Daughter has noted that his gait has been slower and slightly more unsteady [de-identified] : Seems to be less thirsty than before

## 2021-11-13 NOTE — DATA REVIEWED
[FreeTextEntry1] : [facility]  (11/9/21)\par \par FBS 93,  A1c 8.6%\par CMP--Creatinine 1.3 mg%\par LDL 69, HDL 53, \par TSH level normal at 3.05 uU/ml  (0.4-4.5)\par \par Urine microalbumin ratio (collected 11/10/21)  mildly positive at 42  (normal < 30)

## 2021-11-13 NOTE — PHYSICAL EXAM
[Alert] : alert [Normal Sclera/Conjunctiva] : normal sclera/conjunctiva [EOMI] : extra ocular movement intact [PERRL] : pupils equal, round and reactive to light [No Proptosis] : no proptosis [No Lid Lag] : no lid lag [Normal Outer Ear/Nose] : the ears and nose were normal in appearance [No Neck Mass] : no neck mass was observed [No LAD] : no lymphadenopathy [Thyroid Not Enlarged] : the thyroid was not enlarged [No Thyroid Nodules] : no palpable thyroid nodules [Clear to Auscultation] : lungs were clear to auscultation bilaterally [No Murmurs] : no murmurs [Normal Rate] : heart rate was normal [Regular Rhythm] : with a regular rhythm [Carotids Normal] : carotid pulses were normal with no bruits [No Edema] : no peripheral edema [Not Tender] : non-tender [Soft] : abdomen soft [No HSM] : no hepato-splenomegaly [Normal Supraclavicular Nodes] : no supraclavicular lymphadenopathy [Normal Anterior Cervical Nodes] : no anterior cervical lymphadenopathy [No CVA Tenderness] : no ~M costovertebral angle tenderness [No Involuntary Movements] : no involuntary movements were seen [No Joint Swelling] : no joint swelling seen [No Rash] : no rash [Normal] : normal [2+] : 2+ in the dorsalis pedis [Vibration Dec.] : diminished vibratory sensation at the level of the toes [No Tremors] : no tremors [Normal Affect] : the affect was normal [Normal Mood] : the mood was normal [Right foot was examined, including] : right foot ~C was examined, including visual inspection with sensory and pulse exams [Left foot was examined, including] : left foot ~C was examined, including visual inspection with sensory and pulse exams [0] : 0 in the posterior tibialis [Normal Sensation on Monofilament Testing] : normal sensation on monofilament testing of lower extremities [Well Nourished] : well nourished [Healthy Appearance] : healthy appearance [Normal Strength/Tone] : muscle strength and tone were normal [Kyphosis] : no kyphosis present [Acanthosis Nigricans] : no acanthosis nigricans [Foot Ulcers] : no foot ulcers [Delayed in the Right Toes] : normal in the toes [Delayed in the Left Toes] : normal in the toes [Position Sense Dec.] : normal position sense at the level of the toes [#1 Diminished] : number 1 was normal [#2 Diminished] : number 2 was normal [#3 Diminished] : number 3 was normal [#4 Diminished] : number 4 was normal [#5 Diminished] : number 5 was normal [#6 Diminished] : number 6 was normal [#7 Diminished] : number 7 was normal [#8 Diminished] : number 8 was normal [#9 Diminished] : number 9 was normal [#10 Diminished] : number 10 was normal [de-identified] : No corneal arcus [de-identified] : Hearing is nearly normal today with both of his hearing aids in place [de-identified] : DP pulses 2+ on the left, non-palpable (but with normal capillary refill) on the right [de-identified] : Areas of mild lipohypertrophy on either side of the umbilicus (more prominent on the right) [de-identified] : Gait seems somewhat slower and slightly more unsteady [de-identified] : Mycotic toenails.  Keratoses on his scalp

## 2021-12-26 ENCOUNTER — EMERGENCY (EMERGENCY)
Facility: HOSPITAL | Age: 86
LOS: 1 days | Discharge: HOME | End: 2021-12-26
Attending: EMERGENCY MEDICINE | Admitting: EMERGENCY MEDICINE
Payer: MEDICARE

## 2021-12-26 VITALS
RESPIRATION RATE: 18 BRPM | HEART RATE: 67 BPM | HEIGHT: 68 IN | OXYGEN SATURATION: 98 % | TEMPERATURE: 98 F | DIASTOLIC BLOOD PRESSURE: 90 MMHG | WEIGHT: 164.91 LBS | SYSTOLIC BLOOD PRESSURE: 197 MMHG

## 2021-12-26 VITALS
TEMPERATURE: 98 F | DIASTOLIC BLOOD PRESSURE: 67 MMHG | OXYGEN SATURATION: 99 % | SYSTOLIC BLOOD PRESSURE: 180 MMHG | HEART RATE: 73 BPM | RESPIRATION RATE: 18 BRPM

## 2021-12-26 DIAGNOSIS — Z98.42 CATARACT EXTRACTION STATUS, LEFT EYE: Chronic | ICD-10-CM

## 2021-12-26 DIAGNOSIS — E03.9 HYPOTHYROIDISM, UNSPECIFIED: ICD-10-CM

## 2021-12-26 DIAGNOSIS — Z79.82 LONG TERM (CURRENT) USE OF ASPIRIN: ICD-10-CM

## 2021-12-26 DIAGNOSIS — I25.10 ATHEROSCLEROTIC HEART DISEASE OF NATIVE CORONARY ARTERY WITHOUT ANGINA PECTORIS: ICD-10-CM

## 2021-12-26 DIAGNOSIS — E10.22 TYPE 1 DIABETES MELLITUS WITH DIABETIC CHRONIC KIDNEY DISEASE: ICD-10-CM

## 2021-12-26 DIAGNOSIS — E10.649 TYPE 1 DIABETES MELLITUS WITH HYPOGLYCEMIA WITHOUT COMA: ICD-10-CM

## 2021-12-26 DIAGNOSIS — Z79.84 LONG TERM (CURRENT) USE OF ORAL HYPOGLYCEMIC DRUGS: ICD-10-CM

## 2021-12-26 DIAGNOSIS — I12.9 HYPERTENSIVE CHRONIC KIDNEY DISEASE WITH STAGE 1 THROUGH STAGE 4 CHRONIC KIDNEY DISEASE, OR UNSPECIFIED CHRONIC KIDNEY DISEASE: ICD-10-CM

## 2021-12-26 DIAGNOSIS — Z79.4 LONG TERM (CURRENT) USE OF INSULIN: ICD-10-CM

## 2021-12-26 DIAGNOSIS — N18.2 CHRONIC KIDNEY DISEASE, STAGE 2 (MILD): ICD-10-CM

## 2021-12-26 DIAGNOSIS — Z98.41 CATARACT EXTRACTION STATUS, RIGHT EYE: Chronic | ICD-10-CM

## 2021-12-26 DIAGNOSIS — Z98.52 VASECTOMY STATUS: Chronic | ICD-10-CM

## 2021-12-26 DIAGNOSIS — E78.5 HYPERLIPIDEMIA, UNSPECIFIED: ICD-10-CM

## 2021-12-26 DIAGNOSIS — F01.50 VASCULAR DEMENTIA WITHOUT BEHAVIORAL DISTURBANCE: ICD-10-CM

## 2021-12-26 LAB
ALBUMIN SERPL ELPH-MCNC: 4.2 G/DL — SIGNIFICANT CHANGE UP (ref 3.4–5)
ALP SERPL-CCNC: 92 U/L — SIGNIFICANT CHANGE UP (ref 40–120)
ALT FLD-CCNC: 40 U/L — SIGNIFICANT CHANGE UP (ref 12–42)
ANION GAP SERPL CALC-SCNC: 9 MMOL/L — SIGNIFICANT CHANGE UP (ref 9–16)
AST SERPL-CCNC: 30 U/L — SIGNIFICANT CHANGE UP (ref 15–37)
BASOPHILS # BLD AUTO: 0.05 K/UL — SIGNIFICANT CHANGE UP (ref 0–0.2)
BASOPHILS NFR BLD AUTO: 0.4 % — SIGNIFICANT CHANGE UP (ref 0–2)
BILIRUB SERPL-MCNC: 0.3 MG/DL — SIGNIFICANT CHANGE UP (ref 0.2–1.2)
BUN SERPL-MCNC: 27 MG/DL — HIGH (ref 7–23)
CALCIUM SERPL-MCNC: 9.3 MG/DL — SIGNIFICANT CHANGE UP (ref 8.5–10.5)
CHLORIDE SERPL-SCNC: 105 MMOL/L — SIGNIFICANT CHANGE UP (ref 96–108)
CO2 SERPL-SCNC: 28 MMOL/L — SIGNIFICANT CHANGE UP (ref 22–31)
CREAT SERPL-MCNC: 1.3 MG/DL — SIGNIFICANT CHANGE UP (ref 0.5–1.3)
EOSINOPHIL # BLD AUTO: 0.04 K/UL — SIGNIFICANT CHANGE UP (ref 0–0.5)
EOSINOPHIL NFR BLD AUTO: 0.3 % — SIGNIFICANT CHANGE UP (ref 0–6)
GLUCOSE BLDC GLUCOMTR-MCNC: 129 MG/DL — HIGH (ref 70–99)
GLUCOSE BLDC GLUCOMTR-MCNC: 44 MG/DL — CRITICAL LOW (ref 70–99)
GLUCOSE BLDC GLUCOMTR-MCNC: 51 MG/DL — CRITICAL LOW (ref 70–99)
GLUCOSE BLDC GLUCOMTR-MCNC: 57 MG/DL — LOW (ref 70–99)
GLUCOSE BLDC GLUCOMTR-MCNC: 67 MG/DL — LOW (ref 70–99)
GLUCOSE SERPL-MCNC: 67 MG/DL — LOW (ref 70–99)
HCT VFR BLD CALC: 44.2 % — SIGNIFICANT CHANGE UP (ref 39–50)
HGB BLD-MCNC: 14.7 G/DL — SIGNIFICANT CHANGE UP (ref 13–17)
IMM GRANULOCYTES NFR BLD AUTO: 0.5 % — SIGNIFICANT CHANGE UP (ref 0–1.5)
LYMPHOCYTES # BLD AUTO: 1.01 K/UL — SIGNIFICANT CHANGE UP (ref 1–3.3)
LYMPHOCYTES # BLD AUTO: 8 % — LOW (ref 13–44)
MAGNESIUM SERPL-MCNC: 2.5 MG/DL — SIGNIFICANT CHANGE UP (ref 1.6–2.6)
MCHC RBC-ENTMCNC: 30.8 PG — SIGNIFICANT CHANGE UP (ref 27–34)
MCHC RBC-ENTMCNC: 33.3 GM/DL — SIGNIFICANT CHANGE UP (ref 32–36)
MCV RBC AUTO: 92.5 FL — SIGNIFICANT CHANGE UP (ref 80–100)
MONOCYTES # BLD AUTO: 0.97 K/UL — HIGH (ref 0–0.9)
MONOCYTES NFR BLD AUTO: 7.7 % — SIGNIFICANT CHANGE UP (ref 2–14)
NEUTROPHILS # BLD AUTO: 10.45 K/UL — HIGH (ref 1.8–7.4)
NEUTROPHILS NFR BLD AUTO: 83.1 % — HIGH (ref 43–77)
NRBC # BLD: 0 /100 WBCS — SIGNIFICANT CHANGE UP (ref 0–0)
PLATELET # BLD AUTO: 242 K/UL — SIGNIFICANT CHANGE UP (ref 150–400)
POTASSIUM SERPL-MCNC: 4.2 MMOL/L — SIGNIFICANT CHANGE UP (ref 3.5–5.3)
POTASSIUM SERPL-SCNC: 4.2 MMOL/L — SIGNIFICANT CHANGE UP (ref 3.5–5.3)
PROT SERPL-MCNC: 8.4 G/DL — HIGH (ref 6.4–8.2)
RBC # BLD: 4.78 M/UL — SIGNIFICANT CHANGE UP (ref 4.2–5.8)
RBC # FLD: 12.6 % — SIGNIFICANT CHANGE UP (ref 10.3–14.5)
SODIUM SERPL-SCNC: 142 MMOL/L — SIGNIFICANT CHANGE UP (ref 132–145)
TSH SERPL-MCNC: 3.93 UIU/ML — HIGH (ref 0.36–3.74)
WBC # BLD: 12.58 K/UL — HIGH (ref 3.8–10.5)
WBC # FLD AUTO: 12.58 K/UL — HIGH (ref 3.8–10.5)

## 2021-12-26 PROCEDURE — 99284 EMERGENCY DEPT VISIT MOD MDM: CPT

## 2021-12-26 PROCEDURE — 93010 ELECTROCARDIOGRAM REPORT: CPT

## 2021-12-26 RX ORDER — SODIUM CHLORIDE 9 MG/ML
1000 INJECTION INTRAMUSCULAR; INTRAVENOUS; SUBCUTANEOUS ONCE
Refills: 0 | Status: COMPLETED | OUTPATIENT
Start: 2021-12-26 | End: 2021-12-26

## 2021-12-26 RX ADMIN — SODIUM CHLORIDE 1000 MILLILITER(S): 9 INJECTION INTRAMUSCULAR; INTRAVENOUS; SUBCUTANEOUS at 19:39

## 2021-12-26 NOTE — ED ADULT NURSE REASSESSMENT NOTE - NS ED NURSE REASSESS COMMENT FT1
Transfer of care acknowledged with bedside rounding, lab results reviewed, will continue to monitor.   awake, alert, in nad. pending UA, fluids running by bedside

## 2021-12-26 NOTE — ED ADULT TRIAGE NOTE - CHIEF COMPLAINT QUOTE
[place] for hypoglycemia. AS per EMS on arrival pt was altered and diaphoretic. FS 43. Pt given 1 amp of D50 prior to arrival. Pt arrives AAOx3 at this time.

## 2021-12-26 NOTE — ED PROVIDER NOTE - CLINICAL SUMMARY MEDICAL DECISION MAKING FREE TEXT BOX
Additional hypoglycemic episode while in ED @ 550pm without symptoms.  Took his own oral glucose tab (4g) and given food.  Labs, urine ordered.  Trend glucose. Additional hypoglycemic episode while in ED @ 550pm without symptoms.  Took his own oral glucose tab (4g) and given food.  Labs, urine ordered.  Trend glucose.  Hx of IDDM with previous episodes of hypoglycemia.

## 2021-12-26 NOTE — ED PROVIDER NOTE - PATIENT PORTAL LINK FT
You can access the FollowMyHealth Patient Portal offered by [facility] by registering at the following website: [email or web address]. By joining [employer]’s FollowMyHealth portal, you will also be able to view your health information using other applications (apps) compatible with our system.

## 2021-12-26 NOTE — ED ADULT NURSE NOTE - NSICDXPASTSURGICALHX_GEN_ALL_CORE_FT
PAST SURGICAL HISTORY:  S/P cataract surgery, left     S/P cataract surgery, right     S/P vasectomy

## 2021-12-26 NOTE — ED PROVIDER NOTE - PROGRESS NOTE DETAILS
CKD baseline.  POC remains wnl after oral glucose and food intake.  Reviewed with patient and wife.  Likely secondary to diminished PO intake.  Unable to give urine.  Denies hx of UTIs.  Conservative management discussed with the patient in detail.  Close Endo follow up encouraged.  Strict ED return instructions discussed in detail and patient given the opportunity to ask any questions about their discharge diagnosis and instructions

## 2021-12-26 NOTE — ED ADULT NURSE NOTE - OBJECTIVE STATEMENT
Pt aox3 [place]S [place] theater for hypoglycemia. AS per EMS on arrival pt was altered and diaphoretic. FS 43. Pt given 1 amp of D50 prior to arrival. Pt arrives AAOx3 at this time.

## 2021-12-26 NOTE — ED PROVIDER NOTE - OBJECTIVE STATEMENT
BIBEMS for hypoglycemia (41) treated with one amp IV dextrose PTA.  Daughter tried to get him up after movie finished and the patient was weak, unable to stand, diaphoretic, and confused.  Symptoms improved shortly after IV dextrose.  Similar episodes on a weekly basis.  Denies recent change from his baseline habits.  Perhaps had too little to eat today.  Reviewed last Endo note from 11/13/21.  Follows with Dr. Gama Lafleur.  Hx of CKD 2, vascular dementia, HLD, hypothyroid, DM2 with hgb A1C 8.6%  Takes Novolog 28 units before meals and 34 units of Lantus at night.  No change in medication regimen.  Accompanied by his daughter.
DISCHARGE

## 2021-12-26 NOTE — ED PROVIDER NOTE - NEUROLOGICAL, MLM
Alert and oriented, no focal deficits, no motor or sensory deficits.  clear and coherent speech, normal cranial nerve exam, normal finger to nose and ROBSON.  Steady gait.  No pronator drift.

## 2021-12-26 NOTE — ED PROVIDER NOTE - CROS ED MUSC ALL NEG
Discussion/Summary   Your diabetes is well-controlled and within goal your bad cholesterol is too high.  The protein level in your urine is normal.  Please let me know whether you stop the Crestor.  Further recommendations will follow        Verified Results  CBC WITH AUTOMATED DIFFERENTIAL [id number] 09:30AM BERNHEIM, BRUCE   [Jul 3, 2018 6:34AM BERNHEIM, BRUCE]  ok     Test Name Result Flag Reference   WHITE BLOOD COUNT 5.4 K/mcL  4.2-11.0   RED CELL COUNT 4.50 mil/mcL  4.50-5.90   HEMOGLOBIN 13.5 g/dl  13.0-17.0   HEMATOCRIT 41.6 %  39.0-51.0   MEAN CORPUSCULAR VOLUME 92.4 fL  78.0-100.0   MEAN CORPUSCULAR HEMOGLOBIN 30.0 pg  26.0-34.0   MEAN CORPUSCULAR HGB CONC 32.5 g/dl  32.0-36.5   RDW-CV 13.7 %  11.0-15.0   PLATELET COUNT 209 K/mcL  140-450   LON% 56 %     LYM% 28 %     MON% 13 %     EOS% 1 %     BASO% 1 %     LON ABS 3.1 K/mcL  1.8-7.7   LYM ABS 1.5 K/mcL  1.0-4.0   MON ABS 0.7 K/mcL  0.3-0.9   EOS ABS 0.1 K/mcL  0.1-0.5   BASO ABS 0.0 K/mcL  0.0-0.3   DIFF TYPE      AUTOMATED DIFFERENTIAL   NRBC 0 /100 WBC  0   PERCENT IMMATURE GRANULOCYTES 1 %     ABSOLUTE IMMATURE GRANULOCYTES 0.0 K/mcL  0-0.2       Message   Call, mail, let me know whether he stop the Crestor      negative...

## 2021-12-26 NOTE — ED ADULT NURSE NOTE - NSIMPLEMENTINTERV_GEN_ALL_ED
Implemented All Fall Risk Interventions:  [place] to call system. Call bell, personal items and telephone within reach. Instruct patient to call for assistance. Room bathroom lighting operational. Non-slip footwear when patient is off stretcher. Physically safe environment: no spills, clutter or unnecessary equipment. Stretcher in lowest position, wheels locked, appropriate side rails in place. Provide visual cue, wrist band, yellow gown, etc. Monitor gait and stability. Monitor for mental status changes and reorient to person, place, and time. Review medications for side effects contributing to fall risk. Reinforce activity limits and safety measures with patient and family.

## 2021-12-26 NOTE — ED ADULT NURSE NOTE - NSICDXPASTMEDICALHX_GEN_ALL_CORE_FT
PAST MEDICAL HISTORY:  Coronary artery disease involving native coronary artery of native heart without angina pectoris     Essential hypertension     ST elevation myocardial infarction (STEMI), unspecified artery 2006    Stented coronary artery     Type 1 diabetes mellitus without complication

## 2021-12-28 ENCOUNTER — NON-APPOINTMENT (OUTPATIENT)
Age: 86
End: 2021-12-28

## 2022-01-19 ENCOUNTER — EMERGENCY (EMERGENCY)
Facility: HOSPITAL | Age: 87
LOS: 1 days | Discharge: HOME | End: 2022-01-19
Attending: EMERGENCY MEDICINE | Admitting: EMERGENCY MEDICINE
Payer: MEDICARE

## 2022-01-19 VITALS
HEART RATE: 66 BPM | TEMPERATURE: 97 F | RESPIRATION RATE: 16 BRPM | OXYGEN SATURATION: 98 % | SYSTOLIC BLOOD PRESSURE: 144 MMHG | HEIGHT: 68 IN | DIASTOLIC BLOOD PRESSURE: 88 MMHG

## 2022-01-19 DIAGNOSIS — I25.10 ATHEROSCLEROTIC HEART DISEASE OF NATIVE CORONARY ARTERY WITHOUT ANGINA PECTORIS: ICD-10-CM

## 2022-01-19 DIAGNOSIS — I10 ESSENTIAL (PRIMARY) HYPERTENSION: ICD-10-CM

## 2022-01-19 DIAGNOSIS — E10.649 TYPE 1 DIABETES MELLITUS WITH HYPOGLYCEMIA WITHOUT COMA: ICD-10-CM

## 2022-01-19 DIAGNOSIS — R53.1 WEAKNESS: ICD-10-CM

## 2022-01-19 DIAGNOSIS — Z79.82 LONG TERM (CURRENT) USE OF ASPIRIN: ICD-10-CM

## 2022-01-19 DIAGNOSIS — Z98.41 CATARACT EXTRACTION STATUS, RIGHT EYE: Chronic | ICD-10-CM

## 2022-01-19 DIAGNOSIS — Z98.52 VASECTOMY STATUS: Chronic | ICD-10-CM

## 2022-01-19 DIAGNOSIS — I25.2 OLD MYOCARDIAL INFARCTION: ICD-10-CM

## 2022-01-19 DIAGNOSIS — Z98.42 CATARACT EXTRACTION STATUS, LEFT EYE: Chronic | ICD-10-CM

## 2022-01-19 LAB — GLUCOSE BLDC GLUCOMTR-MCNC: 135 MG/DL — HIGH (ref 70–99)

## 2022-01-19 PROCEDURE — 99283 EMERGENCY DEPT VISIT LOW MDM: CPT

## 2022-01-19 NOTE — ED PROVIDER NOTE - CLINICAL SUMMARY MEDICAL DECISION MAKING FREE TEXT BOX
87 yo M with known diabetes with hypoglycemia after delaying meal after lunch time insulin. Glucose now resolved after oral glucose administered by wife. Pt now asymptomatic. Will give pt a sandwich and recheck glucose and if he remains well will discharge. Discussed importance of taking insulin closer to meal.

## 2022-01-19 NOTE — ED PROVIDER NOTE - PATIENT PORTAL LINK FT
You can access the FollowMyHealth Patient Portal offered by [facility] by registering at the following website: [email or web address]. By joining [email or web address]’s FollowMyHealth portal, you will also be able to view your health information using other applications (apps) compatible with our system.

## 2022-01-19 NOTE — ED ADULT TRIAGE NOTE - CHIEF COMPLAINT QUOTE
Pt biba for hypoglycemia, IDDn took his insulin this am but didn't eaat went for a walk with wife when pt felt dizzy and collapsed, given sugar pills by wife  on scene gcs 15 nil c/o pain nil injury to person

## 2022-01-19 NOTE — ED PROVIDER NOTE - OBJECTIVE STATEMENT
89 y/o M with Hx of Diabetes insulin dependant, reports he took his lunch time insulin and delayed lunch as they were going out to eat. In route to lunch while walking he felt weak and somewhat confused similar to prior hypoglycemic episodes. No syncope. He had to sit down on the ground due to weakness. No trauma, no chest pain, no SOB, no headache. Patients wife gave him 4 glucose tablets and reported glucose was in the 40s when she checked it. On arrival of EMS glucose was 152. Now pt feels entirely resolved.

## 2022-01-24 ENCOUNTER — NON-APPOINTMENT (OUTPATIENT)
Age: 87
End: 2022-01-24

## 2022-02-24 ENCOUNTER — APPOINTMENT (OUTPATIENT)
Dept: ENDOCRINOLOGY | Facility: CLINIC | Age: 87
End: 2022-02-24
Payer: MEDICARE

## 2022-02-24 VITALS
WEIGHT: 167 LBS | BODY MASS INDEX: 25.31 KG/M2 | HEART RATE: 67 BPM | DIASTOLIC BLOOD PRESSURE: 73 MMHG | HEIGHT: 68 IN | OXYGEN SATURATION: 98 % | SYSTOLIC BLOOD PRESSURE: 152 MMHG | TEMPERATURE: 98 F

## 2022-02-24 PROCEDURE — 99214 OFFICE O/P EST MOD 30 MIN: CPT

## 2022-03-01 NOTE — DATA REVIEWED
[FreeTextEntry1] : [facility]  (2/15/22)\par \par ,  A1c 8.9%\par CMP WNL\par LDL 91, HDL 59, \par Urine microalbumin ratio moderately positive at 128  (normal < 30)\par TSH level 4.0 uU/ml  (0.4-4.5)\par Hb 13.0 gm, MCV 91

## 2022-03-01 NOTE — PHYSICAL EXAM
[Alert] : alert [Well Nourished] : well nourished [Healthy Appearance] : healthy appearance [Normal Sclera/Conjunctiva] : normal sclera/conjunctiva [EOMI] : extra ocular movement intact [PERRL] : pupils equal, round and reactive to light [No Proptosis] : no proptosis [No Lid Lag] : no lid lag [Normal Outer Ear/Nose] : the ears and nose were normal in appearance [No Neck Mass] : no neck mass was observed [No LAD] : no lymphadenopathy [Thyroid Not Enlarged] : the thyroid was not enlarged [No Thyroid Nodules] : no palpable thyroid nodules [Clear to Auscultation] : lungs were clear to auscultation bilaterally [No Murmurs] : no murmurs [Normal Rate] : heart rate was normal [Regular Rhythm] : with a regular rhythm [Carotids Normal] : carotid pulses were normal with no bruits [No Edema] : no peripheral edema [Not Tender] : non-tender [Soft] : abdomen soft [No HSM] : no hepato-splenomegaly [Normal Supraclavicular Nodes] : no supraclavicular lymphadenopathy [Normal Anterior Cervical Nodes] : no anterior cervical lymphadenopathy [No CVA Tenderness] : no ~M costovertebral angle tenderness [No Involuntary Movements] : no involuntary movements were seen [No Joint Swelling] : no joint swelling seen [Normal Strength/Tone] : muscle strength and tone were normal [No Rash] : no rash [Right foot was examined, including] : right foot ~C was examined, including visual inspection with sensory and pulse exams [Left foot was examined, including] : left foot ~C was examined, including visual inspection with sensory and pulse exams [Normal] : normal [0] : 0 in the posterior tibialis [Vibration Dec.] : diminished vibratory sensation at the level of the toes [2+] : 2+ in the dorsalis pedis [No Tremors] : no tremors [Normal Sensation on Monofilament Testing] : normal sensation on monofilament testing of lower extremities [Normal Affect] : the affect was normal [Normal Mood] : the mood was normal [Kyphosis] : no kyphosis present [Acanthosis Nigricans] : no acanthosis nigricans [Foot Ulcers] : no foot ulcers [Delayed in the Right Toes] : normal in the toes [Delayed in the Left Toes] : normal in the toes [Position Sense Dec.] : normal position sense at the level of the toes [#1 Diminished] : number 1 was normal [#2 Diminished] : number 2 was normal [#3 Diminished] : number 3 was normal [#4 Diminished] : number 4 was normal [#5 Diminished] : number 5 was normal [#6 Diminished] : number 6 was normal [#7 Diminished] : number 7 was normal [#8 Diminished] : number 8 was normal [#9 Diminished] : number 9 was normal [#10 Diminished] : number 10 was normal [de-identified] : No corneal arcus [de-identified] : Hearing is severely impaired today even though his hearing aids are in place and his daughter changed the batteries [de-identified] : DP pulses 2+ on the left, non-palpable (but with normal capillary refill) on the right [de-identified] : Still with distinct areas lipohypertrophy on either side of the umbilicus (more prominent on the right) [de-identified] : Gait seems somewhat slower and slightly more unsteady

## 2022-03-01 NOTE — ASSESSMENT
[FreeTextEntry1] : 1) Type 2 DM:  A1c level remains distinctly elevated, though it is actually lower than expected for the number of fingersticks which are above 300 mg%.  Barriers to improved control remain: \par --Given the night when the 34 units of Lantus dropped him from 123 to 97 mg%, will decrease the dose to 30 units\par --Continue Novolog 24/18/24\par --Wife needs to supervise both the drawing up of the insulin and the injection site for as many doses as possible\par --Highly doubtful that instruction to the pt not to snack in the middle of the night will be followed.\par \par 2) Hypercholesterolemia:  LDL-cholesterol level is now above his target of 70 mg%.  Since nearly all of the previous values on his current regimen have been at goal, and his diet has not significantly changed, suspect that he has been missing doses of atorvastatin.  \par --Continue atorvastatin 80 mg/day\par \par 3) Hypothyroidism:  TSH level remains fairly stable in the high-normal range.  There is no clear indication to start thyroxine replacement.\par --Continue to follow with serial TFTs\par \par 4) Hypertension:  Systolic BP is mildly elevated at today's visit,  His systolics on the readings checked at home are only slightly above 140.  Would consider increasing the enalapril, but he has had problems with hyperkalemia at higher doses.  Would prefer not to add a new medication at this point and complicate his regimen.\par --Continue the current doses of enalapril, HCTZ and metoprolol

## 2022-03-01 NOTE — REVIEW OF SYSTEMS
[Hair Loss] : hair loss [Difficulty Walking] : difficulty walking [Poor Balance] : poor balance [Insomnia] : insomnia [Fatigue] : no fatigue [Decreased Appetite] : appetite not decreased [Recent Weight Gain (___ Lbs)] : no recent weight gain [Recent Weight Loss (___ Lbs)] : no recent weight loss [Fever] : no fever [Chills] : no chills [Dry Eyes] : no dryness [Blurred Vision] : no blurred vision [Eyes Itch] : no itch [Dysphagia] : no dysphagia [Chest Pain] : no chest pain [Palpitations] : no palpitations [Lower Ext Edema] : no lower extremity edema [Shortness Of Breath] : no shortness of breath [Cough] : no cough [Wheezing] : no wheezing [SOB on Exertion] : no shortness of breath on exertion [Nausea] : no nausea [Constipation] : no constipation [Heartburn] : no heartburn [Diarrhea] : no diarrhea [Polyuria] : no polyuria [Dysuria] : no dysuria [Joint Pain] : no joint pain [Muscle Weakness] : no muscle weakness [Myalgia] : no myalgia  [Joint Stiffness] : no joint stiffness [Dry Skin] : no dry skin [Ulcer] : no ulcer [Headaches] : no headaches [Tremors] : no tremors [Pain/Numbness of Digits] : no pain/numbness of digits [Depression] : no depression [Anxiety] : no anxiety [Stress] : no stress [Cold Intolerance] : no cold intolerance [Heat Intolerance] : no heat intolerance [Easy Bleeding] : no ~M tendency for easy bleeding [Easy Bruising] : no tendency for easy bruising [FreeTextEntry4] : Hearing aids have been working satisfactorily most of the time (but not at today's visit) [FreeTextEntry8] : Nocturia 2-3X/night [de-identified] : Daughter has noted that his gait has been slower and slightly more unsteady

## 2022-04-25 ENCOUNTER — APPOINTMENT (OUTPATIENT)
Dept: OTOLARYNGOLOGY | Facility: CLINIC | Age: 87
End: 2022-04-25
Payer: MEDICARE

## 2022-04-25 ENCOUNTER — NON-APPOINTMENT (OUTPATIENT)
Age: 87
End: 2022-04-25

## 2022-04-25 VITALS
TEMPERATURE: 98.2 F | BODY MASS INDEX: 25.76 KG/M2 | DIASTOLIC BLOOD PRESSURE: 49 MMHG | SYSTOLIC BLOOD PRESSURE: 115 MMHG | OXYGEN SATURATION: 97 % | HEIGHT: 68 IN | WEIGHT: 170 LBS | HEART RATE: 69 BPM

## 2022-04-25 PROCEDURE — 92550 TYMPANOMETRY & REFLEX THRESH: CPT

## 2022-04-25 PROCEDURE — 92557 COMPREHENSIVE HEARING TEST: CPT

## 2022-04-25 PROCEDURE — G0268 REMOVAL OF IMPACTED WAX MD: CPT

## 2022-04-25 PROCEDURE — 99203 OFFICE O/P NEW LOW 30 MIN: CPT | Mod: 25

## 2022-04-25 NOTE — REASON FOR VISIT
[Initial Consultation] : an initial consultation for [FreeTextEntry2] : hearing loss, wax impactions

## 2022-04-25 NOTE — HISTORY OF PRESENT ILLNESS
[de-identified] : Bilateral hearing loss & has had HAs for 4 yrs which work so-so. Last audio 14 months ago & he's seen outside for this. He needs periodic cleanings; no tinnitus. Denies: vertigo, ear pain, drainage, frequent loud noise exp/shooting, frequent infections, hx of ear surgery or IV antibiotics/chemo; denies a FHx of hearing loss.

## 2022-04-25 NOTE — CONSULT LETTER
[Dear  ___] : Dear  [unfilled], [Consult Letter:] : I had the pleasure of evaluating your patient, [unfilled]. [Please see my note below.] : Please see my note below. [Consult Closing:] : Thank you very much for allowing me to participate in the care of this patient.  If you have any questions, please do not hesitate to contact me. [Sincerely,] : Sincerely, [FreeTextEntry3] : SHIRA Benavides Jr, MD, FAAOHNS\par Otolaryngologist\par [facility]

## 2022-04-25 NOTE — DATA REVIEWED
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 WR 90% AU\par - Immitance testing w/ type A AU\par

## 2022-04-25 NOTE — PHYSICAL EXAM
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

## 2022-04-25 NOTE — ASSESSMENT
[FreeTextEntry1] : RTC 6 months to recheck the canals; annual audios sooner prn changes. \par \par Rpt HAE upon request

## 2022-06-02 ENCOUNTER — APPOINTMENT (OUTPATIENT)
Dept: ENDOCRINOLOGY | Facility: CLINIC | Age: 87
End: 2022-06-02
Payer: MEDICARE

## 2022-06-02 VITALS
SYSTOLIC BLOOD PRESSURE: 124 MMHG | TEMPERATURE: 97.6 F | HEART RATE: 77 BPM | DIASTOLIC BLOOD PRESSURE: 64 MMHG | WEIGHT: 168 LBS | BODY MASS INDEX: 25.46 KG/M2 | OXYGEN SATURATION: 96 % | HEIGHT: 68 IN

## 2022-06-02 PROCEDURE — 99214 OFFICE O/P EST MOD 30 MIN: CPT

## 2022-06-02 RX ORDER — INSULIN ASPART 100 [IU]/ML
100 INJECTION, SOLUTION INTRAVENOUS; SUBCUTANEOUS
Qty: 7 | Refills: 3 | Status: ACTIVE | COMMUNITY
Start: 2017-07-26

## 2022-06-05 NOTE — REVIEW OF SYSTEMS
[Hair Loss] : hair loss [Difficulty Walking] : difficulty walking [Poor Balance] : poor balance [Insomnia] : insomnia [Hearing Loss] : hearing loss [Fatigue] : no fatigue [Decreased Appetite] : appetite not decreased [Fever] : no fever [Chills] : no chills [Dry Eyes] : no dryness [Blurred Vision] : no blurred vision [Eyes Itch] : no itch [Dysphagia] : no dysphagia [Chest Pain] : no chest pain [Palpitations] : no palpitations [Lower Ext Edema] : no lower extremity edema [Shortness Of Breath] : no shortness of breath [Cough] : no cough [Wheezing] : no wheezing [SOB on Exertion] : no shortness of breath on exertion [Nausea] : no nausea [Constipation] : no constipation [Heartburn] : no heartburn [Diarrhea] : no diarrhea [Polyuria] : no polyuria [Dysuria] : no dysuria [Joint Pain] : no joint pain [Muscle Weakness] : no muscle weakness [Myalgia] : no myalgia  [Joint Stiffness] : no joint stiffness [Dry Skin] : no dry skin [Ulcer] : no ulcer [Headaches] : no headaches [Tremors] : no tremors [Pain/Numbness of Digits] : no pain/numbness of digits [Depression] : no depression [Anxiety] : no anxiety [Stress] : no stress [Cold Intolerance] : no cold intolerance [Heat Intolerance] : no heat intolerance [Easy Bleeding] : no ~M tendency for easy bleeding [Easy Bruising] : no tendency for easy bruising [FreeTextEntry2] : Has lost 2 lb since his last visit [FreeTextEntry4] : See comments in the HPI re: hearing aids [FreeTextEntry8] : Nocturia 2-3X/night [de-identified] : Has skin checks twice a year.  "Usually" wears a hat when outside in the sun [de-identified] : Still using trazodone for his insomnia

## 2022-06-05 NOTE — HISTORY OF PRESENT ILLNESS
[FreeTextEntry1] : 88-year-old man who is followed for insulin-requiring type 2 DM, hyperlipidemia and hypertension.  HIs diabetes was initially diagnosed in 1983, and he has been insulin-requiring since 1993.  His glycemic control has worsened considerably in the past few years, with a rise in A1c levels into the 9-12% range.  Although the main barrier to improved control was previously the large number of meals which he ate out of the house, the main problem during the past 2 years has been his worsening dementia, which has resulted in numerous missed insulin doses.  His wife is now supervising his insulin injections, but not as consistently as  necessary.  \par HIs other active medical problems include IHD (s/p NSTEMI and emergency PCI/stent to the RCA in 2006), moderate dementia, and a stable abdominal aortic aneurysm.\par \par As usual, he came to the visit accompanied by his daughter.\antonella Has not had any acute illnesses since his last visit.\antonella Received his second COVID booster in April.  Had no reaction after the injection.\antonella Saw Dr. Benavides regarding the problems with his hearing aids.  Needed to have cerumen removed from both ears.  They suggested that he return to the [facility] to have the actual devices rechecked.  (They have actually been working satisfactoriy since the visit to Dr. Benavides). \antonella Interms of his other physicians:\antonella Saw his Dr. Lam in May.\antonella Sees his cardiologist Dr. Dumont yearly\antonella Saw Dr. Carcamo for ophth follow-up 2 weeks ago.  Exam was negative\par Taking 30 units of Lantus qhs, premeal Novolog 224/18/24\par --Breakfast is still cold cereal, but he is now having Grape Nuts, and not measuring it out\par --Lunch has changed from salad to a sandwich\par --Eats out for supper 4-5X/week, sometimes with dessert.  If he eats at home, the protein is chicken or pork.  Usual starch is potato\par --Tends to have ice cream after supper\par Testing fingersticks 4X/day.  Readings vary from 100-450 with very few obvious patterns.  Bedtime readins are actually the best of the day, and ofen < 250.  Pre-lunch are the highest, often 350--(?? due to the caloric content of the Grape-Nuts vs missed doses of premeal insulin.  (Pt usually does this himiself--wife supervises the other injections)\par \par \par \par \par \par \par \par \par \par Interim history since his last visit is significant for two severe hypoglycemic episodes which required medical assistance:\par --one in December which occurred in the late afternoon at a movie theater, possibly the result of insufficient carb intake at lunch that day\par --one in January, which occurred after he had taken his Novolog before leaving the apartment to go out to eat at a restaurant.  \par His Novolog dose was decreased to 24 units AC after the first episode, and the lunch dose was decreased to 18 units after the second episode\par Lantus is still 34 units qhs\par Received his second dose of Shingrix at the visit to Dr. Lam in November\par Pt's wife is apparently supervising most of the injections, but there is a question as to whether she has been doing this before breakfast on a consistent basis.  \par Testing fingersticks 4X/day (AC and hs) and brought a log of his readings--but these are widely variable () in no specific pattern.  On one night when his bedtime FS was 123 and he took the 34 units of Lantus, he was 97 the next morning.\par Has been swimming at the [employer] 3 days/week.\par Sees Dr. Carcamo for ophth follow-up twice a year.\par Diet reviewed:\par --Breakfast is usually Corn Flakes and berries\par --Lunch is prepared food from [employer], but includes starch\par --Supper is almost always eaten out in restaurants, and not infrequently includes dessert\par --Will often snack when he is up in the middle of the night--diet soda, banana, etc\par BPs checked at home have been approximately 140-145/70-80.\par

## 2022-06-05 NOTE — PHYSICAL EXAM
[Alert] : alert [Well Nourished] : well nourished [Healthy Appearance] : healthy appearance [Normal Sclera/Conjunctiva] : normal sclera/conjunctiva [EOMI] : extra ocular movement intact [No Proptosis] : no proptosis [No Lid Lag] : no lid lag [Normal Outer Ear/Nose] : the ears and nose were normal in appearance [No Neck Mass] : no neck mass was observed [No LAD] : no lymphadenopathy [Thyroid Not Enlarged] : the thyroid was not enlarged [No Thyroid Nodules] : no palpable thyroid nodules [Clear to Auscultation] : lungs were clear to auscultation bilaterally [No Murmurs] : no murmurs [Normal Rate] : heart rate was normal [Regular Rhythm] : with a regular rhythm [Carotids Normal] : carotid pulses were normal with no bruits [No Edema] : no peripheral edema [Not Tender] : non-tender [Soft] : abdomen soft [No HSM] : no hepato-splenomegaly [Normal Supraclavicular Nodes] : no supraclavicular lymphadenopathy [Normal Anterior Cervical Nodes] : no anterior cervical lymphadenopathy [No CVA Tenderness] : no ~M costovertebral angle tenderness [No Involuntary Movements] : no involuntary movements were seen [No Joint Swelling] : no joint swelling seen [Normal Strength/Tone] : muscle strength and tone were normal [No Rash] : no rash [Normal] : normal [0] : 0 in the posterior tibialis [2+] : 2+ in the dorsalis pedis [Vibration Dec.] : diminished vibratory sensation at the level of the toes [No Tremors] : no tremors [Normal Sensation on Monofilament Testing] : normal sensation on monofilament testing of lower extremities [Normal Affect] : the affect was normal [Normal Mood] : the mood was normal [Kyphosis] : no kyphosis present [Acanthosis Nigricans] : no acanthosis nigricans [Foot Ulcers] : no foot ulcers [Delayed in the Right Toes] : normal in the toes [Delayed in the Left Toes] : normal in the toes [Position Sense Dec.] : normal position sense at the level of the toes [#1 Diminished] : number 1 was normal [#2 Diminished] : number 2 was normal [#3 Diminished] : number 3 was normal [#4 Diminished] : number 4 was normal [#5 Diminished] : number 5 was normal [#6 Diminished] : number 6 was normal [#7 Diminished] : number 7 was normal [#8 Diminished] : number 8 was normal [#9 Diminished] : number 9 was normal [#10 Diminished] : number 10 was normal [de-identified] : Appears younger than his stated age [de-identified] : Pupils miotic.  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

## 2022-09-21 ENCOUNTER — APPOINTMENT (OUTPATIENT)
Dept: ENDOCRINOLOGY | Facility: CLINIC | Age: 87
End: 2022-09-21

## 2022-10-24 ENCOUNTER — APPOINTMENT (OUTPATIENT)
Dept: OTOLARYNGOLOGY | Facility: CLINIC | Age: 87
End: 2022-10-24

## 2022-10-28 ENCOUNTER — APPOINTMENT (OUTPATIENT)
Dept: ENDOCRINOLOGY | Facility: CLINIC | Age: 87
End: 2022-10-28

## 2022-10-28 VITALS
HEART RATE: 80 BPM | SYSTOLIC BLOOD PRESSURE: 131 MMHG | DIASTOLIC BLOOD PRESSURE: 67 MMHG | RESPIRATION RATE: 16 BRPM | OXYGEN SATURATION: 99 % | BODY MASS INDEX: 25.46 KG/M2 | HEIGHT: 68 IN | WEIGHT: 168 LBS

## 2022-10-28 DIAGNOSIS — N18.2 CHRONIC KIDNEY DISEASE, STAGE 2 (MILD): ICD-10-CM

## 2022-10-28 PROCEDURE — 99214 OFFICE O/P EST MOD 30 MIN: CPT

## 2022-10-30 PROBLEM — N18.2 STAGE 2 CHRONIC KIDNEY DISEASE: Status: ACTIVE | Noted: 2017-07-26

## 2022-10-30 RX ORDER — MELOXICAM 15 MG/1
15 TABLET ORAL
Qty: 30 | Refills: 2 | Status: DISCONTINUED | COMMUNITY
Start: 2018-06-01 | End: 2022-10-30

## 2022-10-30 RX ORDER — INSULIN ASPART 100 [IU]/ML
100 INJECTION, SOLUTION INTRAVENOUS; SUBCUTANEOUS
Qty: 7 | Refills: 3 | Status: DISCONTINUED | COMMUNITY
Start: 2022-06-02 | End: 2022-10-30

## 2022-10-30 NOTE — REVIEW OF SYSTEMS
[Hearing Loss] : hearing loss [Hair Loss] : hair loss [Difficulty Walking] : difficulty walking [Poor Balance] : poor balance [Insomnia] : insomnia [Fatigue] : no fatigue [Decreased Appetite] : appetite not decreased [Recent Weight Gain (___ Lbs)] : no recent weight gain [Recent Weight Loss (___ Lbs)] : no recent weight loss [Fever] : no fever [Chills] : no chills [Dry Eyes] : no dryness [Blurred Vision] : no blurred vision [Eyes Itch] : no itch [Dysphagia] : no dysphagia [Chest Pain] : no chest pain [Palpitations] : no palpitations [Lower Ext Edema] : no lower extremity edema [Shortness Of Breath] : no shortness of breath [Cough] : no cough [Wheezing] : no wheezing [SOB on Exertion] : no shortness of breath on exertion [Nausea] : no nausea [Constipation] : no constipation [Heartburn] : no heartburn [Diarrhea] : no diarrhea [Polyuria] : no polyuria [Dysuria] : no dysuria [Muscle Weakness] : no muscle weakness [Myalgia] : no myalgia  [Joint Stiffness] : no joint stiffness [Dry Skin] : no dry skin [Ulcer] : no ulcer [Headaches] : no headaches [Tremors] : no tremors [Pain/Numbness of Digits] : no pain/numbness of digits [Depression] : no depression [Anxiety] : no anxiety [Stress] : no stress [Cold Intolerance] : no cold intolerance [Heat Intolerance] : no heat intolerance [Easy Bleeding] : no ~M tendency for easy bleeding [Easy Bruising] : no tendency for easy bruising [FreeTextEntry4] : See comments in the HPI re: hearing aids [FreeTextEntry8] : Nocturia 2-3X/night [FreeTextEntry9] : Intermittent discomfort in his knees [de-identified] : Has skin checks twice a year. [de-identified] : Gait has improved significantly with use of the rolling walker [de-identified] : Still using trazodone for his insomnia

## 2022-10-30 NOTE — HISTORY OF PRESENT ILLNESS
[FreeTextEntry1] : 88-year-old man who is followed for insulin-requiring type 2 DM, hyperlipidemia and hypertension.  HIs diabetes was initially diagnosed in 1983, and he has been insulin-requiring since 1993.  His glycemic control has worsened considerably in the past few years, with a rise in A1c levels into the 9-12% range.  Although the main barrier to improved control was previously the large number of meals which he ate out of the house, the main problem during the past 2 years has been his worsening dementia, which has resulted in numerous missed insulin doses.  His wife is now supervising his insulin injections, but not as consistently as  necessary.  \par HIs other active medical problems include CAD (s/p NSTEMI and emergency PCI/stent to the RCA in 2006), moderate dementia, and a stable abdominal aortic aneurysm.\par \par As usual, he came to the visit accompanied by his daughter.\antonella Has not had any acute illnesses since his last visit\antonella Saw Dr. Lam in early September--no medication changes were made.  Is scheduled for his yearly physical in January\antonella Doing PT for his gait instability.  Is now using a rolling walker, and his daughter notes that he takes larger steps, and is walking faster.\antonella Lost the hearing aid for his R ear, will be getting a replacement in another week\antonella Received his Bivalent COVID vaccine last month.  Did not have any reaction after the injection.\antonella Is swimming at the [employer] 2-3 X/week.\par \par Taking 30 units of Lantus qhs, pre-meal Novolog 24/18/24\antonella Continues to test fingersticks 3-4X/day.and brought a log of his readings\par --He seems to be missing more pre-breakfast doses, which likely accounts for some of the major spikes to over 300 pre-lunch. (Having cold cereal with berries for breakfast)\par --Readings before and after supper are still quite variable and frequently in the 200 range.  Lunch (when he eats at home, which is now more frequent) is often a salad with arianna bread.  Still eats out for supper, which often includes dessert.\par --Has had very few hypoglycemic reactions\par Seeing Dr. Carcamo for ophth exams every 6 months

## 2022-10-30 NOTE — DATA REVIEWED
[FreeTextEntry1] : [facility]  (8/31/22)\par \par ,  A1c 8.9%\par CMP--Creatinine 1.49 mg%, K 5.0 meq/l\par Urine microalbumin ratio elevated at 91 (normal < 30)\par LDL 72, HDL 49, \par TSH level borderline high at 4.21 uU/ml (0.4-4.5)

## 2022-10-30 NOTE — PHYSICAL EXAM
[Alert] : alert [Well Nourished] : well nourished [Healthy Appearance] : healthy appearance [Normal Sclera/Conjunctiva] : normal sclera/conjunctiva [EOMI] : extra ocular movement intact [No Proptosis] : no proptosis [No Lid Lag] : no lid lag [Normal Outer Ear/Nose] : the ears and nose were normal in appearance [No Neck Mass] : no neck mass was observed [No LAD] : no lymphadenopathy [Thyroid Not Enlarged] : the thyroid was not enlarged [No Thyroid Nodules] : no palpable thyroid nodules [Clear to Auscultation] : lungs were clear to auscultation bilaterally [No Murmurs] : no murmurs [Normal Rate] : heart rate was normal [Regular Rhythm] : with a regular rhythm [Carotids Normal] : carotid pulses were normal with no bruits [No Edema] : no peripheral edema [Not Tender] : non-tender [Soft] : abdomen soft [No HSM] : no hepato-splenomegaly [Normal Supraclavicular Nodes] : no supraclavicular lymphadenopathy [Normal Anterior Cervical Nodes] : no anterior cervical lymphadenopathy [No CVA Tenderness] : no ~M costovertebral angle tenderness [No Involuntary Movements] : no involuntary movements were seen [No Joint Swelling] : no joint swelling seen [Normal Strength/Tone] : muscle strength and tone were normal [No Rash] : no rash [Normal] : normal [0] : 0 in the posterior tibialis [Vibration Dec.] : diminished vibratory sensation at the level of the toes [No Tremors] : no tremors [Normal Sensation on Monofilament Testing] : normal sensation on monofilament testing of lower extremities [Normal Affect] : the affect was normal [Normal Mood] : the mood was normal [PERRL] : pupils equal, round and reactive to light [2+] : 2+ in the dorsalis pedis [Kyphosis] : no kyphosis present [Acanthosis Nigricans] : no acanthosis nigricans [Foot Ulcers] : no foot ulcers [Delayed in the Right Toes] : normal in the toes [Delayed in the Left Toes] : normal in the toes [Position Sense Dec.] : normal position sense at the level of the toes [#1 Diminished] : number 1 was normal [#2 Diminished] : number 2 was normal [#3 Diminished] : number 3 was normal [#4 Diminished] : number 4 was normal [#5 Diminished] : number 5 was normal [#6 Diminished] : number 6 was normal [#7 Diminished] : number 7 was normal [#8 Diminished] : number 8 was normal [#9 Diminished] : number 9 was normal [#10 Diminished] : number 10 was normal [de-identified] : Appears younger than his stated age [de-identified] : No corneal arcus [de-identified] : Hearing is significantly impaired today with only one hearing aid in place [de-identified] : DP pulses 2+ on the left, non-palpable (but with normal capillary refill) on the right [de-identified] : Still with 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

## 2022-10-30 NOTE — ASSESSMENT
[FreeTextEntry1] : 1) Type 2 DM:  A1c level remains markedly elevated, though somewhat lower than most of his previous values.  His log book shows fewer values over 300 mg%, probably reflecting fewer missed doses of insulin before lunch and supper with better supervision by his wife.  He seems to be missing more doses before breakfast, however, resulting in many spikes to over 300 mg% before lunch.  He also continues to have spikes after supper, likely the result of restaurant meals with dessert.\par --Continue his current insulin regimen\par --Again cautioned him against injecting into the lipohypertrophic areas on his abdominal wall, (and pointed these out to his daughter, so she can remind her mother).  \par --The problem of missed insulin doses before breakfast is difficult to solve--his wife does not get up early enough to supervise these \par \par 2) Hyperlipidemia:  LDL-cholesterol level is only a trace above his target of 70 mg%\par --Continue atorvastatin 80 mg/day\par \par 3) Hypertension:  BP is satisfactory at today's visit\par --Continue enalapril, HCTZ and metoprolol\par \par 4) Sub-clinical hypothyroidism:  TSH level is in the high-normal range.  There is no indication to start levothyroxine replacement\par \par 5) CKD:  Creatinine level of 1.49 mg% is now on the borderline between stage 2 and 3 CKD.  Etiology of the CKD is likely multifactorial, but mostly hypertension and atherosclerotic--though there is almost certainly a component of diabetic nephropathy\par --Continue to follow\par --Optimize BP control\par --Enalapril for nephro-protection (see below)\par \par 6) Microalbuminuria:  Ratios continue to fluctuate.  Was elevated to 128 in February, then negative at 25 in May.\par Would ordinarily try to maximize ACE-I therapy, but the dose of enalapril has been limited by hyperkalemia\par --Continue enalapril 5 mg/day.  Will not try to increase given the current K level of 5.0 meq/l\par \par See for follow-up in 3-4 months.  CMP, lipids, A1c, TFTs, microalb, CBC before the visit [FreeTextEntry2] : Diet, injection sites

## 2022-11-29 ENCOUNTER — APPOINTMENT (OUTPATIENT)
Dept: OTOLARYNGOLOGY | Facility: CLINIC | Age: 87
End: 2022-11-29

## 2022-11-29 VITALS
DIASTOLIC BLOOD PRESSURE: 68 MMHG | TEMPERATURE: 97.9 F | HEIGHT: 68 IN | OXYGEN SATURATION: 97 % | BODY MASS INDEX: 25.46 KG/M2 | WEIGHT: 168 LBS | SYSTOLIC BLOOD PRESSURE: 163 MMHG | HEART RATE: 73 BPM

## 2022-11-29 DIAGNOSIS — H61.23 IMPACTED CERUMEN, BILATERAL: ICD-10-CM

## 2022-11-29 DIAGNOSIS — H90.3 SENSORINEURAL HEARING LOSS, BILATERAL: ICD-10-CM

## 2022-11-29 DIAGNOSIS — D16.4 BENIGN NEOPLASM OF BONES OF SKULL AND FACE: ICD-10-CM

## 2022-11-29 PROCEDURE — 69210 REMOVE IMPACTED EAR WAX UNI: CPT

## 2022-11-29 PROCEDURE — 99213 OFFICE O/P EST LOW 20 MIN: CPT | Mod: 25

## 2022-11-29 NOTE — HISTORY OF PRESENT ILLNESS
[de-identified] : Bilateral hearing loss & has had HAs for 4 yrs which work so-so; he lost one and the other broke. He's followed by an outside audiologist for this. Hearing seems stable. He needs periodic cleanings; no tinnitus. Denies: vertigo, ear pain, drainage, frequent loud noise exp/shooting, frequent infections, hx of ear surgery or IV antibiotics/chemo; denies a FHx of hearing loss.

## 2022-11-29 NOTE — DATA REVIEWED
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 WR 90% AU\par - Immitance testing w/ type A AU\par  no

## 2022-11-29 NOTE — PHYSICAL EXAM
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 [FreeTextEntry9] : Large cerumen plug was removed w/ a hook

## 2022-11-29 NOTE — ASSESSMENT
[FreeTextEntry1] : RTC for an ear cleaning in 6 months; annual audios, sooner prn any changes.\par He will f/u with his outside auds for replacement HAs

## 2023-01-01 ENCOUNTER — APPOINTMENT (OUTPATIENT)
Dept: ENDOCRINOLOGY | Facility: CLINIC | Age: 88
End: 2023-01-01
Payer: MEDICARE

## 2023-01-01 ENCOUNTER — RX RENEWAL (OUTPATIENT)
Age: 88
End: 2023-01-01

## 2023-01-01 ENCOUNTER — TRANSCRIPTION ENCOUNTER (OUTPATIENT)
Age: 88
End: 2023-01-01

## 2023-01-01 VITALS
DIASTOLIC BLOOD PRESSURE: 67 MMHG | TEMPERATURE: 97.3 F | SYSTOLIC BLOOD PRESSURE: 151 MMHG | OXYGEN SATURATION: 98 % | HEIGHT: 68 IN | BODY MASS INDEX: 24.71 KG/M2 | WEIGHT: 163 LBS | HEART RATE: 78 BPM

## 2023-01-01 VITALS
WEIGHT: 169 LBS | HEIGHT: 68 IN | TEMPERATURE: 97.7 F | DIASTOLIC BLOOD PRESSURE: 73 MMHG | HEART RATE: 72 BPM | SYSTOLIC BLOOD PRESSURE: 192 MMHG | BODY MASS INDEX: 25.61 KG/M2 | OXYGEN SATURATION: 97 %

## 2023-01-01 DIAGNOSIS — I10 ESSENTIAL (PRIMARY) HYPERTENSION: ICD-10-CM

## 2023-01-01 DIAGNOSIS — E78.00 PURE HYPERCHOLESTEROLEMIA, UNSPECIFIED: ICD-10-CM

## 2023-01-01 DIAGNOSIS — Z79.4 TYPE 2 DIABETES MELLITUS W/OUT COMPLICATIONS: ICD-10-CM

## 2023-01-01 DIAGNOSIS — R80.9 PROTEINURIA, UNSPECIFIED: ICD-10-CM

## 2023-01-01 DIAGNOSIS — E11.9 TYPE 2 DIABETES MELLITUS W/OUT COMPLICATIONS: ICD-10-CM

## 2023-01-01 DIAGNOSIS — E03.8 OTHER SPECIFIED HYPOTHYROIDISM: ICD-10-CM

## 2023-01-01 PROCEDURE — 99215 OFFICE O/P EST HI 40 MIN: CPT

## 2023-01-01 RX ORDER — SYRGE-NDL,INS 0.3 ML HALF MARK 30 G X1/2"
31G X 15/64" SYRINGE, EMPTY DISPOSABLE MISCELLANEOUS
Qty: 360 | Refills: 3 | Status: ACTIVE | COMMUNITY
Start: 2021-03-10 | End: 1900-01-01

## 2023-01-01 RX ORDER — AMLODIPINE BESYLATE 5 MG/1
5 TABLET ORAL DAILY
Qty: 90 | Refills: 3 | Status: ACTIVE | COMMUNITY
Start: 2023-01-01 | End: 1900-01-01

## 2023-01-01 RX ORDER — INSULIN ASPART 100 [IU]/ML
100 INJECTION, SOLUTION INTRAVENOUS; SUBCUTANEOUS
Qty: 6 | Refills: 3 | Status: ACTIVE | COMMUNITY
Start: 2023-01-01 | End: 1900-01-01

## 2023-01-01 RX ORDER — INSULIN GLARGINE 100 [IU]/ML
100 INJECTION, SOLUTION SUBCUTANEOUS
Qty: 3 | Refills: 3 | Status: ACTIVE | COMMUNITY
Start: 2017-07-26 | End: 1900-01-01

## 2023-01-01 RX ORDER — ATORVASTATIN CALCIUM 80 MG/1
80 TABLET, FILM COATED ORAL
Qty: 90 | Refills: 1 | Status: ACTIVE | COMMUNITY
Start: 2017-07-26 | End: 1900-01-01

## 2023-03-05 NOTE — HISTORY OF PRESENT ILLNESS
[FreeTextEntry1] : 89-year-old man who is followed for insulin-requiring type 2 DM, hyperlipidemia and hypertension.  HIs diabetes was initially diagnosed in 1983, and he has been insulin-requiring since 1993.  His glycemic control has worsened considerably in the past few years, with a rise in A1c levels into the 9-12% range.  Although the main barrier to improved control was previously the large number of meals which he ate out of the house, the main problem during the past 2 years has been his worsening dementia, which has resulted in numerous missed insulin doses.  His wife is now supervising his insulin injections, but not as consistently as  necessary.  \par HIs other active medical problems include CAD (s/p NSTEMI and emergency PCI/stent to the RCA in 2006), moderate dementia, and a stable abdominal aortic aneurysm.\par \par As usual, he came to the visit accompanied by his daughter.\antonella Has not had any acute illnesses since his last visit\antonella Saw his PCP Dr. Lam for his yearly physical in January.  No changes in medication were made\antonella Will be seeing Dr. Carcamo for ophth follow-up in April\antonella Has continued testing fingersticks 4X/day, but brought a log only for the past two months.  There is a distinct pattern to the readings during this interval--no pre-breakfast fingerstick value or insulin dose is charted for at least a few days each week, and there is then a major hyperglycemic spike (often into the 400 range) before lunch.  The blood sugars then decrease steadily during the day on some days (but rarely to below 200 mg%), but will sometimes stay > 300 for the entire day.  His daughter is fairly sure that (except for breakfast) the premeal and bedtime insulin doses are supervised by the pt's wife fairly consistently.\par He continues to have cereal for breakfast, almost always GrapeNuts, which he does not measure out.  (GrapeNuts is actually 36 gm of carb for 1/2 cup). Usually has blueberries with the cereal.\par Lunch is still takeout from [employer]--tuna or egg salad, etc, usually with bread (even if not a sandwich)\par Having occasional hypoglycemic reactions in the afternoon, but his daughter is unable to say whether these occur because of lack of starch at lunch vs increased physical activity.  (Does PT twice a week in the afternoon).\par Supper is eaten out approximately half the time.  If cooked at home, the starch will be rice or pasta\antonella Fingersticks at bedtime are often 180-240, but these are among the best readings of the day\antonella Denies eating snacks at bedtime or in the middle of the night, (but his daughter says that he sometimes does)

## 2023-03-05 NOTE — DATA REVIEWED
[FreeTextEntry1] : [facility]  (2/22/23)\par \par , A1c 9,4%\par CMP--Creatinine 1.44 mg%\par Urine microalbumin ratio distinctly positive at 247  (normal < 30)\par LDL 82, HDL 52, \par TSH level borderline high at 4.49 uU/ml  (0.4-4.5)\par Hb 12.0 gm,  MCV 91

## 2023-03-05 NOTE — PHYSICAL EXAM
[Alert] : alert [Well Nourished] : well nourished [Healthy Appearance] : healthy appearance [Normal Sclera/Conjunctiva] : normal sclera/conjunctiva [EOMI] : extra ocular movement intact [PERRL] : pupils equal, round and reactive to light [No Proptosis] : no proptosis [No Lid Lag] : no lid lag [Normal Outer Ear/Nose] : the ears and nose were normal in appearance [No Neck Mass] : no neck mass was observed [No LAD] : no lymphadenopathy [Thyroid Not Enlarged] : the thyroid was not enlarged [No Thyroid Nodules] : no palpable thyroid nodules [Clear to Auscultation] : lungs were clear to auscultation bilaterally [No Murmurs] : no murmurs [Normal Rate] : heart rate was normal [Regular Rhythm] : with a regular rhythm [Carotids Normal] : carotid pulses were normal with no bruits [No Edema] : no peripheral edema [Not Tender] : non-tender [Soft] : abdomen soft [No HSM] : no hepato-splenomegaly [Normal Supraclavicular Nodes] : no supraclavicular lymphadenopathy [Normal Anterior Cervical Nodes] : no anterior cervical lymphadenopathy [No CVA Tenderness] : no ~M costovertebral angle tenderness [No Involuntary Movements] : no involuntary movements were seen [No Joint Swelling] : no joint swelling seen [Normal Strength/Tone] : muscle strength and tone were normal [No Rash] : no rash [Normal] : normal [0] : 0 in the posterior tibialis [2+] : 2+ in the dorsalis pedis [Vibration Dec.] : diminished vibratory sensation at the level of the toes [No Tremors] : no tremors [Normal Sensation on Monofilament Testing] : normal sensation on monofilament testing of lower extremities [Normal Affect] : the affect was normal [Normal Mood] : the mood was normal [Kyphosis] : no kyphosis present [Acanthosis Nigricans] : no acanthosis nigricans [Foot Ulcers] : no foot ulcers [Delayed in the Right Toes] : normal in the toes [Delayed in the Left Toes] : normal in the toes [Position Sense Dec.] : normal position sense at the level of the toes [#1 Diminished] : number 1 was normal [#2 Diminished] : number 2 was normal [#4 Diminished] : number 4 was normal [#3 Diminished] : number 3 was normal [#5 Diminished] : number 5 was normal [#6 Diminished] : number 6 was normal [#7 Diminished] : number 7 was normal [#8 Diminished] : number 8 was normal [#9 Diminished] : number 9 was normal [#10 Diminished] : number 10 was normal [de-identified] : Hearing is significantly better today with hearing aids working properly [de-identified] : No corneal arcus [de-identified] : DP pulses 2+ on the left, non-palpable (but with normal capillary refill) on the right [de-identified] : Multiple non-pigmented keratoses on his scalp [de-identified] : Still with 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

## 2023-03-05 NOTE — ASSESSMENT
[FreeTextEntry2] : Diet, injection sites [FreeTextEntry1] : 1) Type 2 DM:  A1c level remains markedly elevated, with the usual barriers to improved control remaining the missed doses of insulin before breakfast, an excessive carb intake at breakfast, and the inconsistent carb intake at lunch and supper (due to takeout and restaurant meals, etc)\par --Will continue the present insulin regimen for now\par --Emphasized to his daughter that the GrapeNuts cereal is very calorically dense (even thought healthy), and that he is likely eating an excessive portion at breakfast--in addition to not taking insulin before the meal.  She will try having him switch the cereal to Heritage Flakes.  We also discussed the possibility of having his pre-breakfast insulin drawn up the night before and actually taped to the cereal box as a reminder for him to take it.  (An additional possibility is to have the cereal measured out into the bowl the night before)\par --Pointed out the lipohypertrophic areas on the pt's abdominal wall to his daughter, and asked her to make sure that her mother tries to make sure that the pt avoids injecting into these areas\par \par 2) Hypercholesterolemia:  LDL-cholesterol level is mildly above his target of 70 mg%, but compliance with the atorvastatin regimen is likely inconsistent.  \par --Continue atorvastatin 80 mg/day\par --Will consider adding Zetia to his regimen\par \par 3) MIcroalbuminuria:  Current ratio is higher than all of his previous values, and presumably reflects diabetic nephropathy.  The ratios nonetheless fluctuate significantly, and he even had a value in the normal range last May.  Would optimally like to maximize his dose of enalapril, but this has been limited by hyperkalemia.  \par --To continue the enalapril at 5 mg/day, try to maintain strict BP control\par \par 4) Sub-clinical hypothyroidism:  TSH level is borderline elevated, but will not start levothyroxine therapy at this point because the clinical benefit would be difficult to define.  Compliance with the regimen would also likely be poor.  \par \par 5) Hypertension: Systolic BP is mildly elevated at today's visit, but his daughter says that it has been well below 140 at visits to his other doctors.\par --Continue combination therapy with HCTZ, metoprolol and enalapril\par --Encouraged BP monitoring at home\par \par See for follow-up in 4 months.  CMP, lipids, A1c, microalb, TFTs, CBC, Fe/IBC before the visit

## 2023-03-05 NOTE — REVIEW OF SYSTEMS
[Hearing Loss] : hearing loss [Hair Loss] : hair loss [Difficulty Walking] : difficulty walking [Poor Balance] : poor balance [Insomnia] : insomnia [Fatigue] : no fatigue [Decreased Appetite] : appetite not decreased [Recent Weight Gain (___ Lbs)] : no recent weight gain [Fever] : no fever [Recent Weight Loss (___ Lbs)] : no recent weight loss [Chills] : no chills [Dry Eyes] : no dryness [Blurred Vision] : no blurred vision [Eyes Itch] : no itch [Dysphagia] : no dysphagia [Chest Pain] : no chest pain [Palpitations] : no palpitations [Lower Ext Edema] : no lower extremity edema [Shortness Of Breath] : no shortness of breath [Cough] : no cough [Wheezing] : no wheezing [SOB on Exertion] : no shortness of breath on exertion [Nausea] : no nausea [Constipation] : no constipation [Diarrhea] : no diarrhea [Heartburn] : no heartburn [Polyuria] : no polyuria [Dysuria] : no dysuria [Muscle Weakness] : no muscle weakness [Myalgia] : no myalgia  [Dry Skin] : no dry skin [Joint Stiffness] : no joint stiffness [Ulcer] : no ulcer [Headaches] : no headaches [Tremors] : no tremors [Pain/Numbness of Digits] : no pain/numbness of digits [Depression] : no depression [Anxiety] : no anxiety [Stress] : no stress [Cold Intolerance] : no cold intolerance [Heat Intolerance] : no heat intolerance [Easy Bleeding] : no ~M tendency for easy bleeding [Easy Bruising] : no tendency for easy bruising [FreeTextEntry8] : Nocturia 2-3X/night [FreeTextEntry4] : See comments in the HPI re: hearing aids [FreeTextEntry9] : Intermittent discomfort in his knees [de-identified] : Gait has improved significantly with use of the rolling walker [de-identified] : Has skin checks twice a year. [de-identified] : Still using trazodone for his insomnia

## 2023-07-10 PROBLEM — E78.00 PURE HYPERCHOLESTEROLEMIA: Status: ACTIVE | Noted: 2017-07-26

## 2023-07-10 PROBLEM — E11.9 TYPE 2 DIABETES MELLITUS, WITH LONG-TERM CURRENT USE OF INSULIN: Status: ACTIVE | Noted: 2017-11-04

## 2023-07-10 PROBLEM — E03.8 SUBCLINICAL HYPOTHYROIDISM: Status: ACTIVE | Noted: 2020-04-17

## 2023-07-10 PROBLEM — R80.9 MICROALBUMINURIA: Status: ACTIVE | Noted: 2022-10-30

## 2023-07-10 PROBLEM — I10 ESSENTIAL HYPERTENSION: Status: ACTIVE | Noted: 2018-02-02

## 2023-07-10 NOTE — PHYSICAL EXAM
[Alert] : alert [Well Nourished] : well nourished [Normal Sclera/Conjunctiva] : normal sclera/conjunctiva [EOMI] : extra ocular movement intact [PERRL] : pupils equal, round and reactive to light [No Proptosis] : no proptosis [No Lid Lag] : no lid lag [Normal Outer Ear/Nose] : the ears and nose were normal in appearance [No Neck Mass] : no neck mass was observed [No LAD] : no lymphadenopathy [Thyroid Not Enlarged] : the thyroid was not enlarged [No Thyroid Nodules] : no palpable thyroid nodules [Clear to Auscultation] : lungs were clear to auscultation bilaterally [No Murmurs] : no murmurs [Normal Rate] : heart rate was normal [Regular Rhythm] : with a regular rhythm [Carotids Normal] : carotid pulses were normal with no bruits [No Edema] : no peripheral edema [Not Tender] : non-tender [Soft] : abdomen soft [No HSM] : no hepato-splenomegaly [Normal Supraclavicular Nodes] : no supraclavicular lymphadenopathy [Normal Anterior Cervical Nodes] : no anterior cervical lymphadenopathy [No CVA Tenderness] : no ~M costovertebral angle tenderness [No Involuntary Movements] : no involuntary movements were seen [No Joint Swelling] : no joint swelling seen [Normal Strength/Tone] : muscle strength and tone were normal [No Rash] : no rash [Normal] : normal [0] : 0 in the posterior tibialis [Vibration Dec.] : diminished vibratory sensation at the level of the toes [No Tremors] : no tremors [Normal Sensation on Monofilament Testing] : normal sensation on monofilament testing of lower extremities [Normal Affect] : the affect was normal [Normal Mood] : the mood was normal [1+] : 1+ in the dorsalis pedis [Kyphosis] : no kyphosis present [Acanthosis Nigricans] : no acanthosis nigricans [Foot Ulcers] : no foot ulcers [Delayed in the Right Toes] : normal in the toes [Delayed in the Left Toes] : normal in the toes [Position Sense Dec.] : normal position sense at the level of the toes [#1 Diminished] : number 1 was normal [#2 Diminished] : number 2 was normal [#3 Diminished] : number 3 was normal [#4 Diminished] : number 4 was normal [#5 Diminished] : number 5 was normal [#6 Diminished] : number 6 was normal [#7 Diminished] : number 7 was normal [#8 Diminished] : number 8 was normal [#9 Diminished] : number 9 was normal [#10 Diminished] : number 10 was normal [de-identified] : Somewhat more unkempt than usual [de-identified] : No corneal arcus [de-identified] : Hearing is still mildly impaired even with both hearing aids in place [de-identified] : DP pulses 2+ on the left, non-palpable (but with normal capillary refill) on the right [de-identified] : Areas of lipohypertrophy on either side of the umbilicus have actually increased in size, still R > L [de-identified] : Multiple non-pigmented keratoses on his scalp [FreeTextEntry4] : Vibratory sensation absent [FreeTextEntry8] : Vibratory sensation absent over the toes [de-identified] : Proprioception (toes) defective.  Vibratory sensation essentially absent over the toes

## 2023-07-10 NOTE — REVIEW OF SYSTEMS
[Hearing Loss] : hearing loss [Hair Loss] : hair loss [Difficulty Walking] : difficulty walking [Poor Balance] : poor balance [Insomnia] : insomnia [Fatigue] : no fatigue [Decreased Appetite] : appetite not decreased [Fever] : no fever [Chills] : no chills [Dry Eyes] : no dryness [Blurred Vision] : no blurred vision [Eyes Itch] : no itch [Dysphagia] : no dysphagia [Chest Pain] : no chest pain [Palpitations] : no palpitations [Lower Ext Edema] : no lower extremity edema [Shortness Of Breath] : no shortness of breath [Cough] : no cough [Wheezing] : no wheezing [SOB on Exertion] : no shortness of breath on exertion [Nausea] : no nausea [Constipation] : no constipation [Heartburn] : no heartburn [Diarrhea] : no diarrhea [Polyuria] : no polyuria [Dysuria] : no dysuria [Joint Pain] : no joint pain [Muscle Weakness] : no muscle weakness [Myalgia] : no myalgia  [Dry Skin] : no dry skin [Ulcer] : no ulcer [Headaches] : no headaches [Tremors] : no tremors [Pain/Numbness of Digits] : no pain/numbness of digits [Depression] : no depression [Anxiety] : no anxiety [Stress] : no stress [Polydipsia] : no polydipsia [Cold Intolerance] : no cold intolerance [Heat Intolerance] : no heat intolerance [Easy Bleeding] : no ~M tendency for easy bleeding [Easy Bruising] : no tendency for easy bruising [FreeTextEntry2] : Has gained 6 lb since his last visit [FreeTextEntry4] : Daughter takes care of changing the batteries (weekly) and cleaning the hearing aids [FreeTextEntry8] : Nocturia 2-3X/night [de-identified] : Has skin checks twice a year. [de-identified] : Gait is slower and slightly more unstable, but he refuses to use the walker [de-identified] : Still using trazodone for his insomnia

## 2023-07-10 NOTE — ASSESSMENT
[FreeTextEntry1] : Lipohypertrophy--wife needs to supervise actual inject\par ?? prefill breakfst Novolog and taper to cereal box\par check actual readings in meter--Glucoses > 300 should give even higher A1c and not permit weight gain\par Daughter thinks pen would be more difficut\par Hypertension:\par --Change from enalapril to amlodipine\par --check BPs at home\par \par Hyperkalemia:\par --To D/C enalapril\par --Substitute amlodipine 5 mg/day\par --Diet discussed.  Fruits are mostly low-K, has potatoes infrequently, tomato intake is moderate\par \par Gait instability:\par --?? ski poles

## 2023-07-10 NOTE — HISTORY OF PRESENT ILLNESS
[FreeTextEntry1] : 89-year-old man who is followed for insulin-requiring type 2 DM, hyperlipidemia and hypertension.  HIs diabetes was initially diagnosed in 1983, and he has been insulin-requiring since 1993.  His glycemic control has worsened considerably in the past few years, with a rise in A1c levels into the 9-12% range.  Although the main barrier to improved control was previously the large number of meals which he ate out of the house, the main problem during the past 2 years has been his worsening dementia, which has resulted in numerous missed insulin doses.  His wife is now supervising his insulin injections, but not as consistently as  necessary.  \par HIs other active medical problems include CAD (s/p NSTEMI and emergency PCI/stent to the RCA in 2006), moderate dementia, and a stable abdominal aortic aneurysm.\par \antonella As usual, he came to the visit accompanied by his daughter.\antonella Has not had any acute illnesses since his last visit\antonella Had one minor fall in the street, where he failed to lift his foot adequately and tripped on a curb.  Otherwise, does not use any assistive devices outside.  (He apparently walks better with a rolling walker, but refuses to use it).\antonella Has an aide who comes in the afternoon, takes him to the [facility] to go in the pool 3 days/week.  \antonella Insulin regimen is 30 units Lantus qhs, premeal Novolog 24/18/24\antonella Still testing fingersticks 3-4X/day and brought his log:\par --Pre-breakfast readings are charted only for 2 dyas/week on average.  Almost all of these values are in the 300 range.  He probably does not take his pre-breakfast insulin on the days when no reading is charted.  (HIs wife is not usually awake at the time he eats breakfast)  Breakfast is now Heritage Flakes with berries.\par --Readings for the rest of the day are still mostly in the 300-500 range, with occasional unexplained dips to  mg%.  He is unable to explain these\par --Lunch is now usually a sandwich\antonella --Eats at for supper 5 nights a week.  Usually has dessert after the meal--often chocolate-based--sometimes baked goods, sometimes ice cream.\antonella Saw Dr. Lam for a "check-up" in May.\antonella Saw Dr. Carcamo for ophth follow-up in April\antonella \antonella Saw his PCP Dr. Lam for his yearly physical in January.  No changes in medication were made\antonella Will be seeing Dr. Carcamo for ophth follow-up in April\antonella Has continued testing fingersticks 4X/day, but brought a log only for the past two months.  There is a distinct pattern to the readings during this interval--no pre-breakfast fingerstick value or insulin dose is charted for at least a few days each week, and there is then a major hyperglycemic spike (often into the 400 range) before lunch.  The blood sugars then decrease steadily during the day on some days (but rarely to below 200 mg%), but will sometimes stay > 300 for the entire day.  His daughter is fairly sure that (except for breakfast) the premeal and bedtime insulin doses are supervised by the pt's wife fairly consistently.\par He continues to have cereal for breakfast, almost always GrapeNuts, which he does not measure out.  (GrapeNuts is actually 36 gm of carb for 1/2 cup). Usually has blueberries with the cereal.\par Lunch is still takeout from [employer]--tuna or egg salad, etc, usually with bread (even if not a sandwich)\antonella Having occasional hypoglycemic reactions in the afternoon, but his daughter is unable to say whether these occur because of lack of starch at lunch vs increased physical activity.  (Does PT twice a week in the afternoon).\par Supper is eaten out approximately half the time.  If cooked at home, the starch will be rice or pasta\par Fingersticks at bedtime are often 180-240, but these are among the best readings of the day\antonella Denies eating snacks at bedtime or in the middle of the night, (but his daughter says that he sometimes does)

## 2024-01-01 ENCOUNTER — EMERGENCY (EMERGENCY)
Facility: HOSPITAL | Age: 89
LOS: 1 days | Discharge: HOME | End: 2024-01-01
Attending: EMERGENCY MEDICINE | Admitting: EMERGENCY MEDICINE
Payer: MEDICARE

## 2024-01-01 VITALS
WEIGHT: 149.91 LBS | OXYGEN SATURATION: 99 % | SYSTOLIC BLOOD PRESSURE: 182 MMHG | HEART RATE: 77 BPM | DIASTOLIC BLOOD PRESSURE: 90 MMHG | RESPIRATION RATE: 18 BRPM | TEMPERATURE: 98 F | HEIGHT: 70 IN

## 2024-01-01 VITALS
HEART RATE: 78 BPM | TEMPERATURE: 98 F | RESPIRATION RATE: 18 BRPM | OXYGEN SATURATION: 97 % | SYSTOLIC BLOOD PRESSURE: 187 MMHG | DIASTOLIC BLOOD PRESSURE: 93 MMHG

## 2024-01-01 DIAGNOSIS — N39.0 URINARY TRACT INFECTION, SITE NOT SPECIFIED: ICD-10-CM

## 2024-01-01 DIAGNOSIS — Z79.4 LONG TERM (CURRENT) USE OF INSULIN: ICD-10-CM

## 2024-01-01 DIAGNOSIS — E10.649 TYPE 1 DIABETES MELLITUS WITH HYPOGLYCEMIA WITHOUT COMA: ICD-10-CM

## 2024-01-01 DIAGNOSIS — Z98.52 VASECTOMY STATUS: Chronic | ICD-10-CM

## 2024-01-01 DIAGNOSIS — I10 ESSENTIAL (PRIMARY) HYPERTENSION: ICD-10-CM

## 2024-01-01 DIAGNOSIS — Z98.41 CATARACT EXTRACTION STATUS, RIGHT EYE: Chronic | ICD-10-CM

## 2024-01-01 DIAGNOSIS — I25.10 ATHEROSCLEROTIC HEART DISEASE OF NATIVE CORONARY ARTERY WITHOUT ANGINA PECTORIS: ICD-10-CM

## 2024-01-01 DIAGNOSIS — Z98.42 CATARACT EXTRACTION STATUS, LEFT EYE: Chronic | ICD-10-CM

## 2024-01-01 LAB
ALBUMIN SERPL ELPH-MCNC: 3.4 G/DL — SIGNIFICANT CHANGE UP (ref 3.4–5)
ALP SERPL-CCNC: 74 U/L — SIGNIFICANT CHANGE UP (ref 40–120)
ALT FLD-CCNC: 18 U/L — SIGNIFICANT CHANGE UP (ref 12–42)
ANION GAP SERPL CALC-SCNC: 7 MMOL/L — LOW (ref 9–16)
ANION GAP SERPL CALC-SCNC: 7 MMOL/L — LOW (ref 9–16)
APPEARANCE UR: ABNORMAL
AST SERPL-CCNC: 23 U/L — SIGNIFICANT CHANGE UP (ref 15–37)
BACTERIA # UR AUTO: ABNORMAL /HPF
BASOPHILS # BLD AUTO: 0.05 K/UL — SIGNIFICANT CHANGE UP (ref 0–0.2)
BASOPHILS NFR BLD AUTO: 0.5 % — SIGNIFICANT CHANGE UP (ref 0–2)
BILIRUB SERPL-MCNC: 0.2 MG/DL — SIGNIFICANT CHANGE UP (ref 0.2–1.2)
BILIRUB UR-MCNC: NEGATIVE — SIGNIFICANT CHANGE UP
BUN SERPL-MCNC: 43 MG/DL — HIGH (ref 7–23)
BUN SERPL-MCNC: 43 MG/DL — HIGH (ref 7–23)
CALCIUM SERPL-MCNC: 8.8 MG/DL — SIGNIFICANT CHANGE UP (ref 8.5–10.5)
CALCIUM SERPL-MCNC: 9.6 MG/DL — SIGNIFICANT CHANGE UP (ref 8.5–10.5)
CHLORIDE SERPL-SCNC: 100 MMOL/L — SIGNIFICANT CHANGE UP (ref 96–108)
CHLORIDE SERPL-SCNC: 102 MMOL/L — SIGNIFICANT CHANGE UP (ref 96–108)
CO2 SERPL-SCNC: 27 MMOL/L — SIGNIFICANT CHANGE UP (ref 22–31)
CO2 SERPL-SCNC: 29 MMOL/L — SIGNIFICANT CHANGE UP (ref 22–31)
COLOR SPEC: YELLOW — SIGNIFICANT CHANGE UP
CREAT SERPL-MCNC: 1.99 MG/DL — HIGH (ref 0.5–1.3)
CREAT SERPL-MCNC: 2 MG/DL — HIGH (ref 0.5–1.3)
CULTURE RESULTS: SIGNIFICANT CHANGE UP
DIFF PNL FLD: ABNORMAL
EGFR: 31 ML/MIN/1.73M2 — LOW
EGFR: 31 ML/MIN/1.73M2 — LOW
EOSINOPHIL # BLD AUTO: 0.01 K/UL — SIGNIFICANT CHANGE UP (ref 0–0.5)
EOSINOPHIL NFR BLD AUTO: 0.1 % — SIGNIFICANT CHANGE UP (ref 0–6)
EPI CELLS # UR: PRESENT
GLUCOSE BLDC GLUCOMTR-MCNC: 178 MG/DL — HIGH (ref 70–99)
GLUCOSE BLDC GLUCOMTR-MCNC: 72 MG/DL — SIGNIFICANT CHANGE UP (ref 70–99)
GLUCOSE SERPL-MCNC: 336 MG/DL — HIGH (ref 70–99)
GLUCOSE SERPL-MCNC: 71 MG/DL — SIGNIFICANT CHANGE UP (ref 70–99)
GLUCOSE UR QL: 250 MG/DL
HCT VFR BLD CALC: 32.8 % — LOW (ref 39–50)
HGB BLD-MCNC: 11 G/DL — LOW (ref 13–17)
IMM GRANULOCYTES NFR BLD AUTO: 0.2 % — SIGNIFICANT CHANGE UP (ref 0–0.9)
KETONES UR-MCNC: NEGATIVE MG/DL — SIGNIFICANT CHANGE UP
LACTATE BLDV-MCNC: 2 MMOL/L — SIGNIFICANT CHANGE UP (ref 0.5–2)
LEUKOCYTE ESTERASE UR-ACNC: ABNORMAL
LIDOCAIN IGE QN: 25 U/L — SIGNIFICANT CHANGE UP (ref 16–77)
LYMPHOCYTES # BLD AUTO: 1.04 K/UL — SIGNIFICANT CHANGE UP (ref 1–3.3)
LYMPHOCYTES # BLD AUTO: 9.7 % — LOW (ref 13–44)
MAGNESIUM SERPL-MCNC: 2.3 MG/DL — SIGNIFICANT CHANGE UP (ref 1.6–2.6)
MCHC RBC-ENTMCNC: 30.2 PG — SIGNIFICANT CHANGE UP (ref 27–34)
MCHC RBC-ENTMCNC: 33.5 GM/DL — SIGNIFICANT CHANGE UP (ref 32–36)
MCV RBC AUTO: 90.1 FL — SIGNIFICANT CHANGE UP (ref 80–100)
MONOCYTES # BLD AUTO: 1.18 K/UL — HIGH (ref 0–0.9)
MONOCYTES NFR BLD AUTO: 11 % — SIGNIFICANT CHANGE UP (ref 2–14)
NEUTROPHILS # BLD AUTO: 8.45 K/UL — HIGH (ref 1.8–7.4)
NEUTROPHILS NFR BLD AUTO: 78.5 % — HIGH (ref 43–77)
NITRITE UR-MCNC: POSITIVE
NRBC # BLD: 0 /100 WBCS — SIGNIFICANT CHANGE UP (ref 0–0)
PH UR: 5 — SIGNIFICANT CHANGE UP (ref 5–8)
PLATELET # BLD AUTO: 252 K/UL — SIGNIFICANT CHANGE UP (ref 150–400)
POTASSIUM SERPL-MCNC: 4.4 MMOL/L — SIGNIFICANT CHANGE UP (ref 3.5–5.3)
POTASSIUM SERPL-MCNC: 4.7 MMOL/L — SIGNIFICANT CHANGE UP (ref 3.5–5.3)
POTASSIUM SERPL-SCNC: 4.4 MMOL/L — SIGNIFICANT CHANGE UP (ref 3.5–5.3)
POTASSIUM SERPL-SCNC: 4.7 MMOL/L — SIGNIFICANT CHANGE UP (ref 3.5–5.3)
PROT SERPL-MCNC: 7.5 G/DL — SIGNIFICANT CHANGE UP (ref 6.4–8.2)
PROT UR-MCNC: 100 MG/DL
RBC # BLD: 3.64 M/UL — LOW (ref 4.2–5.8)
RBC # FLD: 12.5 % — SIGNIFICANT CHANGE UP (ref 10.3–14.5)
RBC CASTS # UR COMP ASSIST: 50 /HPF — HIGH (ref 0–4)
SODIUM SERPL-SCNC: 134 MMOL/L — SIGNIFICANT CHANGE UP (ref 132–145)
SODIUM SERPL-SCNC: 138 MMOL/L — SIGNIFICANT CHANGE UP (ref 132–145)
SP GR SPEC: 1.02 — SIGNIFICANT CHANGE UP (ref 1–1.03)
SPECIMEN SOURCE: SIGNIFICANT CHANGE UP
TROPONIN I, HIGH SENSITIVITY RESULT: 60.2 NG/L — SIGNIFICANT CHANGE UP
TSH SERPL-MCNC: 1.52 UIU/ML — SIGNIFICANT CHANGE UP (ref 0.36–3.74)
UROBILINOGEN FLD QL: 0.2 MG/DL — SIGNIFICANT CHANGE UP (ref 0.2–1)
WBC # BLD: 10.75 K/UL — HIGH (ref 3.8–10.5)
WBC # FLD AUTO: 10.75 K/UL — HIGH (ref 3.8–10.5)
WBC UR QL: SIGNIFICANT CHANGE UP /HPF (ref 0–5)

## 2024-01-01 PROCEDURE — 99291 CRITICAL CARE FIRST HOUR: CPT

## 2024-01-01 RX ORDER — CEFTRIAXONE 500 MG/1
1000 INJECTION, POWDER, FOR SOLUTION INTRAMUSCULAR; INTRAVENOUS ONCE
Refills: 0 | Status: COMPLETED | OUTPATIENT
Start: 2024-01-01 | End: 2024-01-01

## 2024-01-01 RX ORDER — CEFUROXIME AXETIL 250 MG
1 TABLET ORAL
Qty: 20 | Refills: 0
Start: 2024-01-01 | End: 2024-01-24

## 2024-01-01 RX ORDER — SODIUM CHLORIDE 9 MG/ML
500 INJECTION INTRAMUSCULAR; INTRAVENOUS; SUBCUTANEOUS ONCE
Refills: 0 | Status: COMPLETED | OUTPATIENT
Start: 2024-01-01 | End: 2024-01-01

## 2024-01-01 RX ADMIN — SODIUM CHLORIDE 250 MILLILITER(S): 9 INJECTION INTRAMUSCULAR; INTRAVENOUS; SUBCUTANEOUS at 17:42

## 2024-01-01 RX ADMIN — CEFTRIAXONE 100 MILLIGRAM(S): 500 INJECTION, POWDER, FOR SOLUTION INTRAMUSCULAR; INTRAVENOUS at 19:20

## 2024-01-15 NOTE — ED PROVIDER NOTE - NSDCPRINTRESULTS_ED_ALL_ED
Pt notified.   Patient requests all Lab, Cardiology, and Radiology Results on their Discharge Instructions

## 2024-01-15 NOTE — ED ADULT NURSE NOTE - OBJECTIVE STATEMENT
Patient is a 89 y/o M co hypoglycemia. Patient bibems for hypoglycemia. Patient caregiver reports history of Type II DM. Patient reports fs was 37 and was lethargic at home. Patient given I amp of dex by ems. Patient fs 62 upon arrival. Patient given apple juice and cookies. Patient able to tolerate PO. Patient is a 89 y/o M co hypoglycemia. Patient bibems for hypoglycemia. Patient caregiver reports history of Type II DM. Patient reports fs was 37 and was lethargic at home. Patient given I amp of dex by ems. Patient fs 62 upon arrival. Patient given apple juice and cookies. Patient able to tolerate PO.

## 2024-01-15 NOTE — ED PROVIDER NOTE - CLINICAL SUMMARY MEDICAL DECISION MAKING FREE TEXT BOX
Patient is a 89-year-old male with a history of diabetes who is brought in by EMS status post possible seizure secondary to hypoglycemia.  On scene patient's fingerstick was in the 30s, improved with D50.  Here patient's fingerstick improved with food.  We will perform basic labs.  EKG.  And serial fingersticks.  Clinically the patient appears well with no signs of pneumonia, respiratory distress, or symptoms of infection.  We will check a urinalysis to rule out occult urinary infection.

## 2024-01-15 NOTE — ED PROVIDER NOTE - OBJECTIVE STATEMENT
Patient is a 89-year-old male with a history of hypertension, coronary artery disease, and diabetes melitis type I.  Patient is on a insulin sliding scale with long-acting insulin at night.  Patient was in usual state of health until this afternoon at approximately 1 PM when his home health aide found him altered with seizure-like activity of the upper extremities.  He denies any falls or trauma of any sort.  EMS was called and performed a fingerstick that was in the 30s.  They gave D50 and patient came back to normal mental status.  The home health aide reports patient ate a normal breakfast which consisted of cereal and blueberries and then his wife administered his regular insulin.  Patient's home health aide was preparing his lunch when this episode occurred.  Home health aide reports similar in the past.  Denies any other abnormal symptoms.  Denies any fever, chills, chest pain, shortness of breath, nausea, vomiting, diarrhea, dysuria, rash, or any other concerns.

## 2024-01-15 NOTE — ED PROVIDER NOTE - NSFOLLOWUPINSTRUCTIONS_ED_ALL_ED_FT
-PLEASE FOLLOW-UP WITH YOUR PRIMARY CARE DOCTOR IN 1-2 DAYS.  BRING ALL PAPERWORK FROM TODAY'S VISIT TO YOUR FOLLOW-UP VISIT.  YOUR KIDNEY FUNCTION (CREATININE) TODAY WAS 2.0, PLEASE HAVE THIS EVALUATED AS IT MAY BE HIGHER THAN HIS USUAL KIDNEY FUNCTION.  HE ALSO HAS A UTI AND WE NEED TO ENSURE THAT HE IS IMPROVING.      -PLEASE RE-START THE INSULIN SLIDING SCALE PER USUAL ROUTINE, HOWEVER BE VERY CAREFUL WITH CHECKING SUGARS AS THE UTI MAY THROW OFF LEVELS.     -PLEASE GO TO YOUR PHARMACY TO FILL YOUR PRESCRIPTIONS.  PLEASE START TODAY AND USE AS DIRECTED.    -PLEASE RETURN TO THE ER IMMEDIATELY OR CALL 911 FOR ANY HIGH FEVER, TROUBLE BREATHING, VOMITING, SEVERE PAIN, OR ANY OTHER CONCERNS.

## 2024-01-15 NOTE — ED PROVIDER NOTE - CARE PLAN
Principal Discharge DX:	Hypoglycemia   1 Principal Discharge DX:	Hypoglycemia  Secondary Diagnosis:	Urinary tract infection

## 2024-01-15 NOTE — ED PROVIDER NOTE - CRITICAL CARE ATTENDING CONTRIBUTION TO CARE
The patient was seen immediately upon arrival due to a high probability of imminent or life-threatening deterioration secondary to hypoglycemia, which required my direct attention, intervention, and personal management at the bedside. I have personally provided critical care time exclusive of time spent on separately billable procedures. Time includes review of laboratory data, serial FS checks, discussion with the patient's family/HHA, and monitoring for potential decompensation.

## 2024-01-15 NOTE — ED ADULT TRIAGE NOTE - CHIEF COMPLAINT QUOTE
pt biba from home s/p seizure/syncope; bgl 37 on scene, given 1 amp D50, recheck is 172. hx dm htn demetntia

## 2024-01-15 NOTE — ED ADULT NURSE NOTE - NSFALLUNIVINTERV_ED_ALL_ED
Bed/Stretcher in lowest position, wheels locked, appropriate side rails in place/Call bell, personal items and telephone in reach/Instruct patient to call for assistance before getting out of bed/chair/stretcher/Non-slip footwear applied when patient is off stretcher/[place] to call system/Physically safe environment - no spills, clutter or unnecessary equipment/Purposeful proactive rounding/Room/bathroom lighting operational, light cord in reach Bed/Stretcher in lowest position, wheels locked, appropriate side rails in place/Call bell, personal items and telephone in reach/Instruct patient to call for assistance before getting out of bed/chair/stretcher/Non-slip footwear applied when patient is off stretcher/[place] to call system/Physically safe environment - no spills, clutter or unnecessary equipment/Purposeful proactive rounding/Room/bathroom lighting operational, light cord in reach Bed/Stretcher in lowest position, wheels locked, appropriate side rails in place/Call bell, personal items and telephone in reach/Instruct patient to call for assistance before getting out of bed/chair/stretcher/Non-slip footwear applied when patient is off stretcher/[place] to call system/Physically safe environment - no spills, clutter or unnecessary equipment/Purposeful proactive rounding/Room/bathroom lighting operational, light cord in reach

## 2024-01-15 NOTE — ED PROVIDER NOTE - PATIENT PORTAL LINK FT
You can access the FollowMyHealth Patient Portal offered by [facility] by registering at the following website: [email or web address]. By joining "[employer]"’s FollowMyHealth portal, you will also be able to view your health information using other applications (apps) compatible with our system. You can access the FollowMyHealth Patient Portal offered by [facility] by registering at the following website: [email or web address]. By joining [employer]’s FollowMyHealth portal, you will also be able to view your health information using other applications (apps) compatible with our system. You can access the FollowMyHealth Patient Portal offered by [facility] by registering at the following website: [email or web address]. By joining [employer]’s FollowMyHealth portal, you will also be able to view your health information using other applications (apps) compatible with our system.

## 2024-01-15 NOTE — ED PROVIDER NOTE - DIFFERENTIAL DIAGNOSIS
Differential Diagnosis Differential diagnosis includes hypoglycemia from underlying metabolic/infectious issue, insulin overdose (unintentional), versus lack of p.o. intake.

## 2024-01-15 NOTE — ED PROVIDER NOTE - PROGRESS NOTE DETAILS
All results discussed with the patient, his daughter, who came to be with him in the emergency department, and his home health aide.  I discussed my concerns of the elevated kidney function test and the patient's daughter reports that this has been high in the past and is usually chronically high.  She does not know his baseline creatinine.  I also discussed the need to go back to his regular insulin regime now that the sugars have steadily been elevated.  I discussed the urinary tract infection.  Patient is asymptomatic here and vital signs are stable.  I offered admission however the patient and his family would like to keep him out of the hospital to avoid nosocomial infections at all cost.  I agree with the caveat that he follows up with his primary care doctor in 1 to 2 days.  His daughter reports that he has a follow-up appointment in 1 week with the endocrinologist and at the end of the month with the PCP.  However, she will move the PCP appointment up to this week.  I discussed emergent return precautions at length and patient and his family clearly understand red flags in which to call 911 or return to the emergency department.

## 2024-01-26 ENCOUNTER — APPOINTMENT (OUTPATIENT)
Dept: ENDOCRINOLOGY | Facility: CLINIC | Age: 89
End: 2024-01-26

## 2024-09-19 NOTE — ED ADULT NURSE NOTE - NS ED NURSE DC PT EDUCATION RESOURCES
Health Maintenance       Lung Cancer Screening (Yearly)  Ordered on 8/27/2024    Colorectal Cancer Risk - Colonoscopy (Yearly)  Overdue since 12/1/2017    Pneumococcal Vaccine 65+ (2 of 2 - PCV)  Overdue since 1/24/2020    Shingles Vaccine (2 of 2)  Overdue since 8/13/2020    Abdominal Aortic Aneurysm (AAA) Screening (Once)  Ordered on 8/27/2024    COVID-19 Vaccine (3 - 2023-24 season)  Overdue since 9/1/2024    Influenza Vaccine (1)  Due since 9/1/2024           Following review of the above:  Patient is not proceeding with: COVID-19, Influenza, and Pneumococcal patient recently received cologuard    Note: Refer to final orders and clinician documentation.         Yes

## 2024-09-26 NOTE — HISTORY OF PRESENT ILLNESS
[FreeTextEntry1] : 88-year-old man who is followed for insulin-requiring type 2 DM, hyperlipidemia and hypertension.  HIs diabetes was initially diagnosed in 1983, and he has been insulin-requiring since 1993.  His glycemic control has worsened considerably in the past few years, with a rise in A1c levels into the 9-12% range.  Although the main barrier to improved control was previously the large number of meals which he ate out of the house, the main problem during the past 2 years has been his worsening dementia, which has resulted in numerous missed insulin doses.  His wife is now supervising his insulin injections, but not as consistently as  necessary.  \par HIs other active medical problems include IHD (s/p NSTEMI and emergency PCI/stent to the RCA in 2006), moderate dementia, and a stable abdominal aortic aneurysm.\par \par As usual, he came to the visit accompanied by his daughter.\par Interim history since his last visit is significant for two severe hypoglycemic episodes which required medical assistance:\par --one in December which occurred in the late afternoon at a movie theater, possibly the result of insufficient carb intake at lunch that day\par --one in January, which occurred after he had taken his Novolog before leaving the apartment to go out to eat at a restaurant.  \par His Novolog dose was decreased to 24 units AC after the first episode, and the lunch dose was decreased to 18 units after the second episode\par Lantus is still 34 units qhs\par Received his second dose of Shingrix at the visit to Dr. Lam in November\par Pt's wife is apparently supervising most of the injections, but there is a question as to whether she has been doing this before breakfast on a consistent basis.  \par Testing fingersticks 4X/day (AC and hs) and brought a log of his readings--but these are widely variable () in no specific pattern.  On one night when his bedtime FS was 123 and he took the 34 units of Lantus, he was 97 the next morning.\par Has been swimming at the [employer] 3 days/week.\par Sees Dr. Carcamo for ophth follow-up twice a year.\par Diet reviewed:\par --Breakfast is usually Corn Flakes and berries\par --Lunch is prepared food from [employer], but includes starch\par --Supper is almost always eaten out in restaurants, and not infrequently includes dessert\par --Will often snack when he is up in the middle of the night--diet soda, banana, etc\par BPs checked at home have been approximately 140-145/70-80.\par  Show Applicator Variable?: Yes Detail Level: Zone Post-Care Instructions: I reviewed with the patient in detail post-care instructions. Patient is to wear sunprotection, and avoid picking at any of the treated lesions. Pt may apply Vaseline to crusted or scabbing areas. Render Post-Care Instructions In Note?: no Number Of Freeze-Thaw Cycles: 3 freeze-thaw cycles Consent: The patient's consent was obtained including but not limited to risks of crusting, scabbing, blistering, scarring, darker or lighter pigmentary change, recurrence, incomplete removal and infection. Medical Necessity Clause: This procedure was medically necessary because the lesions that were treated were: Medical Necessity Information: It is in your best interest to select a reason for this procedure from the list below. All of these items fulfill various CMS LCD requirements except the new and changing color options. Spray Paint Text: The liquid nitrogen was applied to the skin utilizing a spray paint frosting technique. Detail Level: Detailed

## 2025-02-20 NOTE — ED PROVIDER NOTE - CROS ED CARDIOVAS ALL NEG
[FreeTextEntry1] : Pt presented to wound care for R foot wound has been packing the wound then applying Santyl. Wound started by stepping on metal parts. Pt got admitted at [facility] on 1/9 for R cellulitis, Pt was discharged on 1/15 on Keflex for 10 days. Pt has been doing dressing change by himself.  
negative...
